# Patient Record
Sex: FEMALE | Race: WHITE | NOT HISPANIC OR LATINO | Employment: FULL TIME | URBAN - NONMETROPOLITAN AREA
[De-identification: names, ages, dates, MRNs, and addresses within clinical notes are randomized per-mention and may not be internally consistent; named-entity substitution may affect disease eponyms.]

---

## 2023-05-01 ENCOUNTER — OFFICE VISIT (OUTPATIENT)
Dept: URGENT CARE | Facility: CLINIC | Age: 48
End: 2023-05-01

## 2023-05-01 ENCOUNTER — APPOINTMENT (OUTPATIENT)
Dept: RADIOLOGY | Facility: CLINIC | Age: 48
End: 2023-05-01

## 2023-05-01 VITALS
RESPIRATION RATE: 18 BRPM | TEMPERATURE: 98.6 F | SYSTOLIC BLOOD PRESSURE: 158 MMHG | OXYGEN SATURATION: 99 % | DIASTOLIC BLOOD PRESSURE: 90 MMHG | HEIGHT: 64 IN | WEIGHT: 190 LBS | BODY MASS INDEX: 32.44 KG/M2 | HEART RATE: 104 BPM

## 2023-05-01 DIAGNOSIS — M54.16 LUMBAR RADICULOPATHY: Primary | ICD-10-CM

## 2023-05-01 DIAGNOSIS — M54.42 ACUTE BILATERAL LOW BACK PAIN WITH LEFT-SIDED SCIATICA: ICD-10-CM

## 2023-05-01 DIAGNOSIS — M54.16 LUMBAR RADICULOPATHY: ICD-10-CM

## 2023-05-01 PROBLEM — G62.9 NEUROPATHY: Status: ACTIVE | Noted: 2018-06-07

## 2023-05-01 PROBLEM — M51.9 LUMBAR DISC DISEASE: Status: ACTIVE | Noted: 2018-06-07

## 2023-05-01 PROBLEM — E03.9 HYPOTHYROID: Status: ACTIVE | Noted: 2018-06-07

## 2023-05-01 PROBLEM — I10 HYPERTENSION: Status: ACTIVE | Noted: 2018-06-07

## 2023-05-01 LAB
SL AMB  POCT GLUCOSE, UA: ABNORMAL
SL AMB LEUKOCYTE ESTERASE,UA: ABNORMAL
SL AMB POCT BILIRUBIN,UA: ABNORMAL
SL AMB POCT BLOOD,UA: ABNORMAL
SL AMB POCT CLARITY,UA: CLEAR
SL AMB POCT COLOR,UA: YELLOW
SL AMB POCT KETONES,UA: ABNORMAL
SL AMB POCT NITRITE,UA: ABNORMAL
SL AMB POCT PH,UA: 6
SL AMB POCT SPECIFIC GRAVITY,UA: 1020
SL AMB POCT URINE PROTEIN: ABNORMAL
SL AMB POCT UROBILINOGEN: 0.2

## 2023-05-01 RX ORDER — ALPRAZOLAM 0.5 MG/1
0.75 TABLET ORAL DAILY
COMMUNITY
Start: 2023-03-31

## 2023-05-01 RX ORDER — LEVOTHYROXINE SODIUM 0.15 MG/1
1 TABLET ORAL DAILY
COMMUNITY
Start: 2023-01-30

## 2023-05-01 RX ORDER — CYCLOBENZAPRINE HCL 10 MG
1 TABLET ORAL 3 TIMES DAILY PRN
COMMUNITY
Start: 2023-04-24

## 2023-05-01 RX ORDER — LOSARTAN POTASSIUM AND HYDROCHLOROTHIAZIDE 12.5; 5 MG/1; MG/1
1 TABLET ORAL DAILY
COMMUNITY
Start: 2023-04-24

## 2023-05-01 RX ORDER — LOSARTAN POTASSIUM 50 MG/1
1 TABLET ORAL DAILY
COMMUNITY
Start: 2023-01-30

## 2023-05-01 RX ORDER — PREDNISONE 10 MG/1
TABLET ORAL
Qty: 20 TABLET | Refills: 0 | Status: SHIPPED | OUTPATIENT
Start: 2023-05-01

## 2023-05-01 RX ORDER — IBUPROFEN 800 MG/1
TABLET ORAL
COMMUNITY
Start: 2023-04-27

## 2023-05-01 NOTE — PATIENT INSTRUCTIONS
Back Pain   WHAT YOU NEED TO KNOW:   Back pain is common  You may have back pain and muscle spasms  You may feel sore or stiff on one or both sides of your back  The pain may spread to your lower body  Conditions that affect the spine, joints, or muscles can cause back pain  These may include arthritis, spinal stenosis (narrowing of the spinal column), muscle tension, or breakdown of the spinal discs  DISCHARGE INSTRUCTIONS:   Call your local emergency number (911 in the [facility],[address]) if:   You have severe back pain with chest pain  You cannot control your urine or bowel movements  Your pain becomes so severe that you cannot walk  Return to the emergency department if:   You have pain, numbness, or weakness in one or both legs  You have severe back pain, nausea, and vomiting  You have severe back pain that spreads to your side or genital area  Call your doctor if:   You have back pain that does not get better with rest and pain medicine  You have a fever  You have pain that worsens when you are on your back or when you rest     You have pain that worsens when you cough or sneeze  You lose weight without trying  You have questions or concerns about your condition or care  Medicines: You may need any of the following:  NSAIDs  help decrease swelling and pain or fever  This medicine is available with or without a doctor's order  NSAIDs can cause stomach bleeding or kidney problems in certain people  If you take blood thinner medicine, always ask your healthcare provider if NSAIDs are safe for you  Always read the medicine label and follow directions  Acetaminophen  decreases pain and fever  It is available without a doctor's order  Ask how much to take and how often to take it  Follow directions   Read the labels of all other medicines you are using to see if they also contain acetaminophen, or ask your doctor or pharmacist  Acetaminophen can cause liver damage if not taken correctly  Muscle relaxers  help decrease muscle spasms and back pain  Prescription pain medicine  may be given  Ask your healthcare provider how to take this medicine safely  Some prescription pain medicines contain acetaminophen  Do not take other medicines that contain acetaminophen without talking to your healthcare provider  Too much acetaminophen may cause liver damage  Prescription pain medicine may cause constipation  Ask your healthcare provider how to prevent or treat constipation  Take your medicine as directed  Contact your healthcare provider if you think your medicine is not helping or if you have side effects  Tell your provider if you are allergic to any medicine  Keep a list of the medicines, vitamins, and herbs you take  Include the amounts, and when and why you take them  Bring the list or the pill bottles to follow-up visits  Carry your medicine list with you in case of an emergency  How to manage your back pain:   Apply ice  on your back for 15 to 20 minutes every hour or as directed  Use an ice pack, or put crushed ice in a plastic bag  Cover it with a towel before you apply it to your skin  Ice helps prevent tissue damage and decreases pain  Apply heat  on your back for 20 to 30 minutes every 2 hours for as many days as directed  Heat helps decrease pain and muscle spasms  Stay active  as much as you can without causing more pain  Bed rest could make your back pain worse  Avoid heavy lifting until your pain is gone  Go to physical therapy  as directed  A physical therapist can teach you exercises to help improve movement and strength, and to decrease pain  Follow up with your doctor in 2 weeks, or as directed: You might need to see a specialist  Write down your questions so you remember to ask them during your visits    © Copyright [employer] 2022 Information is for End User's use only and may not be sold, redistributed or otherwise used for commercial purposes  The above information is an  only  It is not intended as medical advice for individual conditions or treatments  Talk to your doctor, nurse or pharmacist before following any medical regimen to see if it is safe and effective for you

## 2023-05-01 NOTE — PROGRESS NOTES
[facility]        NAME: Lorita Osler is a 49 y o  female  : 1975    MRN: [id number]  DATE: May 1, 2023  TIME: 1:05 PM    Assessment and Plan   Lumbar radiculopathy [M54 16]  1  Lumbar radiculopathy  POCT urine dip    XR spine lumbar 2 or 3 views injury    predniSONE 10 mg tablet    Urine culture      2  Acute bilateral low back pain with left-sided sciatica              Patient Instructions   Patient Instructions     Back Pain   WHAT YOU NEED TO KNOW:   Back pain is common  You may have back pain and muscle spasms  You may feel sore or stiff on one or both sides of your back  The pain may spread to your lower body  Conditions that affect the spine, joints, or muscles can cause back pain  These may include arthritis, spinal stenosis (narrowing of the spinal column), muscle tension, or breakdown of the spinal discs  DISCHARGE INSTRUCTIONS:   Call your local emergency number (911 in the [address] [place] [address],[address]) if:    You have severe back pain with chest pain   You cannot control your urine or bowel movements   Your pain becomes so severe that you cannot walk  Return to the emergency department if:    You have pain, numbness, or weakness in one or both legs   You have severe back pain, nausea, and vomiting   You have severe back pain that spreads to your side or genital area  Call your doctor if:   Deorlando Nielsonow have back pain that does not get better with rest and pain medicine   You have a fever   You have pain that worsens when you are on your back or when you rest      You have pain that worsens when you cough or sneeze   You lose weight without trying   You have questions or concerns about your condition or care  Medicines: You may need any of the following:   NSAIDs  help decrease swelling and pain or fever  This medicine is available with or without a doctor's order  NSAIDs can cause stomach bleeding or kidney problems in certain people   If you take blood thinner medicine, always ask your healthcare provider if NSAIDs are safe for you  Always read the medicine label and follow directions   Acetaminophen  decreases pain and fever  It is available without a doctor's order  Ask how much to take and how often to take it  Follow directions  Read the labels of all other medicines you are using to see if they also contain acetaminophen, or ask your doctor or pharmacist  Acetaminophen can cause liver damage if not taken correctly   Muscle relaxers  help decrease muscle spasms and back pain   Prescription pain medicine  may be given  Ask your healthcare provider how to take this medicine safely  Some prescription pain medicines contain acetaminophen  Do not take other medicines that contain acetaminophen without talking to your healthcare provider  Too much acetaminophen may cause liver damage  Prescription pain medicine may cause constipation  Ask your healthcare provider how to prevent or treat constipation   Take your medicine as directed  Contact your healthcare provider if you think your medicine is not helping or if you have side effects  Tell your provider if you are allergic to any medicine  Keep a list of the medicines, vitamins, and herbs you take  Include the amounts, and when and why you take them  Bring the list or the pill bottles to follow-up visits  Carry your medicine list with you in case of an emergency  How to manage your back pain:    Apply ice  on your back for 15 to 20 minutes every hour or as directed  Use an ice pack, or put crushed ice in a plastic bag  Cover it with a towel before you apply it to your skin  Ice helps prevent tissue damage and decreases pain   Apply heat  on your back for 20 to 30 minutes every 2 hours for as many days as directed  Heat helps decrease pain and muscle spasms   Stay active  as much as you can without causing more pain  Bed rest could make your back pain worse  Avoid heavy lifting until your pain is gone   Go to physical therapy  as directed  A physical therapist can teach you exercises to help improve movement and strength, and to decrease pain  Follow up with your doctor in 2 weeks, or as directed: You might need to see a specialist  Write down your questions so you remember to ask them during your visits  © Copyright [employer] 2022 Information is for End User's use only and may not be sold, redistributed or otherwise used for commercial purposes  The above information is an  only  It is not intended as medical advice for individual conditions or treatments  Talk to your doctor, nurse or pharmacist before following any medical regimen to see if it is safe and effective for you  Follow up with PCP in 3-5 days  Proceed to  ER if symptoms worsen  Chief Complaint     Chief Complaint   Patient presents with    Back Pain    Hip Pain    Knee Pain     Left sided          History of Present Illness       The patient is a 79-year-old female with a past medical history significant for lumbar disc disease, thoracic disc disease, pretension, and generalized anxiety disorder who presents to the clinic complaining of severe lower back pain for the past 3 days  The patient showed me a her last MRI report that showed severe spinal stenosis and lumbar disc disease and L for L5 and mild lumbar disc disease and L to L3  She also has mild disease located in her thoracic spine  She states that her symptoms typically are well controlled with ibuprofen and a muscle relaxer as needed however she states for the 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  She states that she has been helping to lift the patient and is unsure if she injured her back with lifting the patient or if she just overdid 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 [employer]    She states typically she only has lower back pain and she is now having new symptoms of pain radiating to her left hip and down her left leg  She denies associated saddle numbness, loss of bowel function, loss of urine function, hematuria, dysuria, or vaginal discharge  She states that the pain is constant and is worse with movement  She has been having trouble walking, moving, or laying flat due to the pain  She states that she has been using her ibuprofen more than she should be over the last few days due to the amount of pain she is in  Her current pain level is 10 out of 10  Review of Systems   Review of Systems   Constitutional: Negative for chills and fever  HENT: Negative for congestion, dental problem, drooling, ear discharge, ear pain, mouth sores, nosebleeds, postnasal drip, rhinorrhea, sinus pressure, sinus pain and sore throat  Eyes: Negative for pain and visual disturbance  Respiratory: Negative for cough and shortness of breath  Cardiovascular: Negative for chest pain and palpitations  Gastrointestinal: Positive for constipation  Negative for abdominal distention, abdominal pain, anal bleeding, blood in stool, diarrhea, nausea, rectal pain and vomiting  Genitourinary: Negative for difficulty urinating, dyspareunia, dysuria, enuresis, flank pain, frequency, genital sores and hematuria  Musculoskeletal: Negative for arthralgias and back pain  Skin: Negative for color change, pallor, rash and wound  Neurological: Negative for seizures and syncope  All other systems reviewed and are negative          Current Medications       Current Outpatient Medications:     ALPRAZolam (XANAX) 0 5 mg tablet, Take 0 75 mg by mouth daily, Disp: , Rfl:     cyclobenzaprine (FLEXERIL) 10 mg tablet, Take 1 tablet by mouth 3 (three) times a day as needed, Disp: , Rfl:     levothyroxine 150 mcg tablet, Take 1 tablet by mouth daily, Disp: , Rfl:     losartan (COZAAR) 50 mg tablet, Take 1 tablet by mouth daily, Disp: , Rfl:     losartan-hydrochlorothiazide (HYZAAR) 50-12 5 mg per "tablet, Take 1 tablet by mouth daily, Disp: , Rfl:     predniSONE 10 mg tablet, 4 po for 2 days, then 3x2, 2x2, and 1x2, Disp: 20 tablet, Rfl: 0    ibuprofen (MOTRIN) 800 mg tablet, TAKE 1 TABLET BY MOUTH TWICE DAILY IF NEEDED FOR MILD PAIN OR HEADACHE, Disp: , Rfl:     Current Allergies     Allergies as of 05/01/2023 - never reviewed   Allergen Reaction Noted    Cephalexin Anaphylaxis 01/22/2018    Penicillin g Angioedema 01/28/2019            The following portions of the patient's history were reviewed and updated as appropriate: allergies, current medications, past family history, past medical history, past social history, past surgical history and problem list      Past Medical History:   Diagnosis Date    Anxiety     Hypertension     Thyroid condition        History reviewed  No pertinent surgical history  History reviewed  No pertinent family history  Medications have been verified  Objective   /90   Pulse 104   Temp 98 6 °F (37 °C)   Resp 18   Ht 5' 4\" (1 626 m)   Wt 86 2 kg (190 lb)   SpO2 99%   BMI 32 61 kg/m²        Physical Exam     Physical Exam  Constitutional:       Appearance: She is well-developed  She is not diaphoretic  Comments: The patient is in pain   HENT:      Head: Normocephalic  Eyes:      General:         Right eye: No discharge  Left eye: No discharge  Pupils: Pupils are equal, round, and reactive to light  Neck:      Thyroid: No thyromegaly  Cardiovascular:      Rate and Rhythm: Normal rate  Heart sounds: No murmur heard  Pulmonary:      Effort: Pulmonary effort is normal  No respiratory distress  Breath sounds: No wheezing or rales  Chest:      Chest wall: No tenderness  Abdominal:      General: There is no distension  Palpations: Abdomen is soft  Tenderness: There is no abdominal tenderness  There is no guarding or rebound  Musculoskeletal:      Cervical back: Normal range of motion        Thoracic " back: No swelling, lacerations, spasms or tenderness  Lumbar back: Spasms and tenderness present  No swelling, deformity or signs of trauma  Decreased range of motion  Positive right straight leg raise test  Negative left straight leg raise test         Back:    Lymphadenopathy:      Cervical: No cervical adenopathy  Skin:     General: Skin is warm  Neurological:      Mental Status: She is alert and oriented to person, place, and time  Sensory: No sensory deficit  Motor: Weakness present  Gait: Gait abnormal       Deep Tendon Reflexes: Reflexes abnormal       Comments: The patient walks with a limp on the left  -He has decreased patellar reflexes noted on the left  -She has decreased strength to her left lower extremity  X-ray was reviewed  There is severe degenerative changes with spondylolisthesis noted  -Urine did show a small amount of blood but no signs of infection  I will send out a urine culture  I urged her to contact her primary care doctor for follow-up as she likely will need repeat MRI if her symptoms fail to improve  She declined a Toradol injection in the clinic today  I will treat her with oral prednisone  She was instructed to avoid all other NSAIDs while on Toradol  She can continue to take Flexeril and up to 3000 mg of Tylenol daily  She was instructed to go to the ER if symptoms worsen

## 2023-05-04 LAB — BACTERIA UR CULT: NORMAL

## 2023-06-05 ENCOUNTER — OFFICE VISIT (OUTPATIENT)
Dept: URGENT CARE | Facility: CLINIC | Age: 48
End: 2023-06-05
Payer: COMMERCIAL

## 2023-06-05 VITALS
HEART RATE: 88 BPM | HEIGHT: 64 IN | TEMPERATURE: 98 F | OXYGEN SATURATION: 99 % | RESPIRATION RATE: 18 BRPM | WEIGHT: 192 LBS | SYSTOLIC BLOOD PRESSURE: 134 MMHG | DIASTOLIC BLOOD PRESSURE: 82 MMHG | BODY MASS INDEX: 32.78 KG/M2

## 2023-06-05 DIAGNOSIS — L01.00 IMPETIGO: Primary | ICD-10-CM

## 2023-06-05 PROCEDURE — 99213 OFFICE O/P EST LOW 20 MIN: CPT | Performed by: PHYSICIAN ASSISTANT

## 2023-06-05 RX ORDER — HYDROCODONE BITARTRATE AND ACETAMINOPHEN 5; 325 MG/1; MG/1
TABLET ORAL
COMMUNITY
Start: 2023-05-07

## 2023-06-05 RX ORDER — CLINDAMYCIN HYDROCHLORIDE 300 MG/1
300 CAPSULE ORAL 4 TIMES DAILY
Qty: 28 CAPSULE | Refills: 0 | Status: SHIPPED | OUTPATIENT
Start: 2023-06-05 | End: 2023-06-12

## 2023-06-05 NOTE — PROGRESS NOTES
[facility]        NAME: Kolby Rosas is a 51 y o  female  : 1975    MRN: [id number]  DATE: 2023  TIME: 3:04 PM    Assessment and Plan   Impetigo [L01 00]  1  Impetigo  clindamycin (CLEOCIN) 300 MG capsule            Patient Instructions   Patient Instructions   Impetigo   WHAT YOU NEED TO KNOW:   Impetigo is a skin infection caused by bacteria  The infection can cause sores to form anywhere on your body  The sores develop watery or pus-filled blisters that break and form thick crusts  Impetigo is most common in children and spreads easily from person to person  DISCHARGE INSTRUCTIONS:   Return to the emergency department if:   • You have painful, red, warm skin around the blisters  • Your face is swollen  • You urinate less than usual or there is blood in your urine  Contact your healthcare provider if:   • You have a fever  • The sores become more red, swollen, warm, or tender  • The sores do not start to heal after 3 days of treatment  • You have questions or concerns about your condition or care  Medicines:   • Antibiotics  treat the bacterial infection  Antibiotics may be given as a pill or cream  Wash your skin and gently remove any crusts before you apply the antibiotic cream      • Take your medicine as directed  Contact your healthcare provider if you think your medicine is not helping or if you have side effects  Tell your provider if you are allergic to any medicine  Keep a list of the medicines, vitamins, and herbs you take  Include the amounts, and when and why you take them  Bring the list or the pill bottles to follow-up visits  Carry your medicine list with you in case of an emergency  Prevent the spread of impetigo:   • Avoid direct contact  You can spread impetigo if someone touches or uses something that touched your infected skin  You can also spread impetigo on your own body when you touch the area and then touch somewhere else   Keep the sores covered with gauze so you will not scratch or touch them  Keep your fingernails short  Your child may need to wear mittens so he does not scratch his sores  • Wash your hands often  Always wash your hands after you touch the infected area  Wash your hands before you touch food, your eyes, or other people  If no water is available, use an alcohol-based gel to clean your hands  • Wash household items  Do not share or reuse items that have come in contact with impetigo sores  Examples include bedding, towels, washcloths, and eating utensils  These items may be used again after they have been washed with hot water and soap  Clean your sores safely:  Wash your skin sores with antibacterial soap and water  You may need to do this 2 to 3 times each day until the sores heal  If the area is crusted, gently wash the sores with gauze or a clean washcloth to remove the crust  Pat the area dry with a clean towel  Wash your hands, the washcloth, and the towel after you clean the area around the sores  Return to work or school: You may return to work or school 48 hours after you start the antibiotic medicine  If your child has impetigo, tell his school or  center about the infection  Follow up with your doctor as directed:  Write down your questions so you remember to ask them during your visits  © Copyright [facility] 2022 Information is for End User's use only and may not be sold, redistributed or otherwise used for commercial purposes  The above information is an  only  It is not intended as medical advice for individual conditions or treatments  Talk to your doctor, nurse or pharmacist before following any medical regimen to see if it is safe and effective for you  Follow up with PCP in 3-5 days  Proceed to  ER if symptoms worsen      Chief Complaint     Chief Complaint   Patient presents with   • Insect Bite         History of Present Illness       The patient is a 54-year-old female who presents to the clinic for a rash on her left index finger for approximately 3 days  Her  was seen last week in the clinic for impetigo on his face  She states that he was holding her hand and she is concerned that he may have passed the impetigo to her  She states that the rash is burning in nature  She states it started out as a blister and now has increased redness and purulent drainage  She denies associated fevers or chills  Review of Systems   Review of Systems   Skin: Positive for rash           Current Medications       Current Outpatient Medications:   •  ALPRAZolam (XANAX) 0 5 mg tablet, Take 0 75 mg by mouth daily, Disp: , Rfl:   •  clindamycin (CLEOCIN) 300 MG capsule, Take 1 capsule (300 mg total) by mouth 4 (four) times a day for 7 days, Disp: 28 capsule, Rfl: 0  •  cyclobenzaprine (FLEXERIL) 10 mg tablet, Take 1 tablet by mouth 3 (three) times a day as needed, Disp: , Rfl:   •  HYDROcodone-acetaminophen (NORCO) 5-325 mg per tablet, TAKE 1 TO 2 TABLETS BY MOUTH 3 TIMES A DAY FOR SEVERE PAIN, Disp: , Rfl:   •  ibuprofen (MOTRIN) 800 mg tablet, TAKE 1 TABLET BY MOUTH TWICE DAILY IF NEEDED FOR MILD PAIN OR HEADACHE, Disp: , Rfl:   •  levothyroxine 150 mcg tablet, Take 1 tablet by mouth daily, Disp: , Rfl:   •  losartan (COZAAR) 50 mg tablet, Take 1 tablet by mouth daily, Disp: , Rfl:   •  losartan-hydrochlorothiazide (HYZAAR) 50-12 5 mg per tablet, Take 1 tablet by mouth daily, Disp: , Rfl:   •  predniSONE 10 mg tablet, 4 po for 2 days, then 3x2, 2x2, and 1x2 (Patient not taking: Reported on 6/5/2023), Disp: 20 tablet, Rfl: 0    Current Allergies     Allergies as of 06/05/2023 - Reviewed 06/05/2023   Allergen Reaction Noted   • Cephalexin Anaphylaxis 01/22/2018   • Penicillin g Angioedema 01/28/2019            The following portions of the patient's history were reviewed and updated as appropriate: allergies, current medications, past family history, past medical history, past "social history, past surgical history and problem list      Past Medical History:   Diagnosis Date   • Anxiety    • Hypertension    • Thyroid condition        Past Surgical History:   Procedure Laterality Date   • BACK SURGERY  05/11/2023       History reviewed  No pertinent family history  Medications have been verified  Objective   /82   Pulse 88   Temp 98 °F (36 7 °C)   Resp 18   Ht 5' 4\" (1 626 m)   Wt 87 1 kg (192 lb)   SpO2 99%   BMI 32 96 kg/m²        Physical Exam     Physical Exam  Musculoskeletal:      Right hand: Swelling present  Normal strength  Normal sensation  There is no disruption of two-point discrimination  Normal capillary refill  Normal pulse  Hands:       Comments: There is an erythematous bullae noted on the posterior aspect of the left index finger that is approximately 2 cm in size  This is erythematous  There is no purulent drainage              -I will treat for podagra since her  has similar symptoms  The patient has multiple allergies to antibiotics but states that she cannot tolerate clindamycin therefore I will give her clindamycin for her symptoms  I suggest follow-up with PCP or go to the ER if symptoms worsen        "

## 2023-06-05 NOTE — PATIENT INSTRUCTIONS
Impetigo   WHAT YOU NEED TO KNOW:   Impetigo is a skin infection caused by bacteria  The infection can cause sores to form anywhere on your body  The sores develop watery or pus-filled blisters that break and form thick crusts  Impetigo is most common in children and spreads easily from person to person  DISCHARGE INSTRUCTIONS:   Return to the emergency department if:   You have painful, red, warm skin around the blisters  Your face is swollen  You urinate less than usual or there is blood in your urine  Contact your healthcare provider if:   You have a fever  The sores become more red, swollen, warm, or tender  The sores do not start to heal after 3 days of treatment  You have questions or concerns about your condition or care  Medicines:   Antibiotics  treat the bacterial infection  Antibiotics may be given as a pill or cream  Wash your skin and gently remove any crusts before you apply the antibiotic cream      Take your medicine as directed  Contact your healthcare provider if you think your medicine is not helping or if you have side effects  Tell your provider if you are allergic to any medicine  Keep a list of the medicines, vitamins, and herbs you take  Include the amounts, and when and why you take them  Bring the list or the pill bottles to follow-up visits  Carry your medicine list with you in case of an emergency  Prevent the spread of impetigo:   Avoid direct contact  You can spread impetigo if someone touches or uses something that touched your infected skin  You can also spread impetigo on your own body when you touch the area and then touch somewhere else  Keep the sores covered with gauze so you will not scratch or touch them  Keep your fingernails short  Your child may need to wear mittens so he does not scratch his sores  Wash your hands often  Always wash your hands after you touch the infected area  Wash your hands before you touch food, your eyes, or other people  If no water is available, use an alcohol-based gel to clean your hands  Wash household items  Do not share or reuse items that have come in contact with impetigo sores  Examples include bedding, towels, washcloths, and eating utensils  These items may be used again after they have been washed with hot water and soap  Clean your sores safely:  Wash your skin sores with antibacterial soap and water  You may need to do this 2 to 3 times each day until the sores heal  If the area is crusted, gently wash the sores with gauze or a clean washcloth to remove the crust  Pat the area dry with a clean towel  Wash your hands, the washcloth, and the towel after you clean the area around the sores  Return to work or school: You may return to work or school 48 hours after you start the antibiotic medicine  If your child has impetigo, tell his school or  center about the infection  Follow up with your doctor as directed:  Write down your questions so you remember to ask them during your visits  © Copyright Margarita Araiza 2022 Information is for End User's use only and may not be sold, redistributed or otherwise used for commercial purposes  The above information is an  only  It is not intended as medical advice for individual conditions or treatments  Talk to your doctor, nurse or pharmacist before following any medical regimen to see if it is safe and effective for you

## 2023-06-19 ENCOUNTER — EVALUATION (OUTPATIENT)
Dept: PHYSICAL THERAPY | Facility: CLINIC | Age: 48
End: 2023-06-19
Payer: COMMERCIAL

## 2023-06-19 DIAGNOSIS — M51.26 HERNIATION OF LUMBAR INTERVERTEBRAL DISC: Primary | ICD-10-CM

## 2023-06-19 PROCEDURE — G0283 ELEC STIM OTHER THAN WOUND: HCPCS | Performed by: PHYSICAL THERAPIST

## 2023-06-19 PROCEDURE — 97162 PT EVAL MOD COMPLEX 30 MIN: CPT | Performed by: PHYSICAL THERAPIST

## 2023-06-19 PROCEDURE — 97014 ELECTRIC STIMULATION THERAPY: CPT | Performed by: PHYSICAL THERAPIST

## 2023-06-19 NOTE — PROGRESS NOTES
PT Evaluation     Today's date: 2023  Patient name: Deya Mederos  : 1975  MRN: [id number]  Referring provider: Cathy Byrne MD  Dx:   Encounter Diagnosis     ICD-10-CM    1  Herniation of lumbar intervertebral disc  M51 26                      Assessment  Assessment details: Patient is a 51year old female who presents to PT s/p laminectomy and discectomy performed on   PT examination shows limitations including weakness, decreased rom/flexibility, limited lumbar and core stability and increased pain and numbness limiting functional independence  Patient would benefit from PT intervention including exercises for rom, strength and stability, functional training, manual therapy, HEP, balance training, aerobic conditioning and pain relieving modalities in order to maximize functional independence  Impairments: abnormal gait, abnormal muscle tone, abnormal or restricted ROM, activity intolerance, impaired physical strength, lacks appropriate home exercise program and pain with function    Goals  ST  Initiate HEP  2  Patient to report decreased pain/numbness by 25% in 4 weeks    LT  Patient to increase left LE strength to 5/5 t/o in 8 weeks  2  Patient to report decreased numbness/pain by 50-75% in 8 weeks  3   Patient to improve standing and sitting tolerance by 50% in 8 weeks    Plan  Patient would benefit from: PT eval and skilled physical therapy  Planned modality interventions: cryotherapy, low level laser therapy, thermotherapy: hydrocollator packs, ultrasound and unattended electrical stimulation  Other planned modality interventions: Modalities PRN  Planned therapy interventions: abdominal trunk stabilization, IADL retraining, ADL retraining, manual therapy, massage, balance, neuromuscular re-education, patient education, postural training, strengthening, stretching, therapeutic activities, therapeutic exercise, therapeutic training, functional ROM exercises, flexibility, graded activity, graded exercise and home exercise program  Frequency: 2x week  Duration in visits: 8  Duration in weeks: 4        Subjective Evaluation    History of Present Illness  Date of surgery: 2023  Mechanism of injury: surgery  Mechanism of injury: Patient presents to PT s/p discectomy and laminectomy done   Patient reports she has had back problems for awhile however she developed increased back pain recently after wear and tear on her back from working  Patient reports her main complaint after the surgery is the numbness in her left LE  She reports the numbness was there prior to surgery also  She reports some pain in the middle of her back but not much in lower back  Patient reports weakness in her left LE with occasions of her knee giving out  Patient was at her surgeon's last week and he said the numbness is normal and should resolve  Patient does not have specific restrictions at this time  She reports sitting and standing for longer periods of time will cause increased symptoms  She returns to surgeon's office in 3 weeks  Patient is now referred to oppt  Pain  At best pain rating: 3  At worst pain ratin  Quality: dull ache  Relieving factors: heat and medications  Aggravating factors: walking, standing and sitting    Patient Goals  Patient goal: To get the numbness gone         Objective     Concurrent Complaints  Negative for night pain, disturbed sleep, bladder dysfunction, bowel dysfunction, saddle (S4) numbness, cardiac problem, kidney problem, gallbladder problem, stomach problem, ulcer, appendix problem, spleen problem, pancreas problem, history of cancer, history of trauma and infection    Palpation   Left   Muscle spasm in the lumbar paraspinals  Tenderness of the lumbar paraspinals  Right   Muscle spasm in the lumbar paraspinals  Tenderness of the lumbar paraspinals       Neurological Testing     Sensation     Lumbar   Left   Diminished: light touch  Paresthesia: light touch    Right   Intact: light touch    Strength/Myotome Testing     Lumbar     Right   Normal strength    Left Hip   Planes of Motion   Flexion: 4-  Abduction: 4+  Adduction: 4+    Left Knee   Flexion: 4+  Extension: 4-    Left Ankle/Foot   Dorsiflexion: 4+    Muscle Activation   Patient unable to activate left transverse abdominals and right transverse abdominals       General Comments:    Lower quarter screen   Hips: unremarkable  Knees: unremarkable  Foot/ankle: unremarkable                Precautions Recent Laminectomy and Discectomy       Manuals 6/19       B/L LE Stretch                                Neuro Re-Ed         Abdominal Brace        Brace with heel slide        Brace with knee fallout                                        Ther Ex        [facility] for strength        TR/HR        Standing SLR 3-way        Standing Marches                                        Ther Activity                        Gait Training                        Modalities        IFC with MHP  Seated 15 min

## 2023-06-19 NOTE — LETTER
2023    Ismael John, [facility] [address]    Patient: Preethi Nye   YOB: 1975   Date of Visit: 2023     Encounter Diagnosis     ICD-10-CM    1  Herniation of lumbar intervertebral disc  M51 26           Dear Dr Kathi Venegas: Thank you for your recent referral of Preethi Valerio  Please review the attached evaluation summary from May's recent visit  Please verify that you agree with the plan of care by signing the attached order  If you have any questions or concerns, please do not hesitate to call  I sincerely appreciate the opportunity to share in the care of one of your patients and hope to have another opportunity to work with you in the near future  Sincerely,    Traci Ramírez, PT      Referring Provider:      I certify that I have read the below Plan of Care and certify the need for these services furnished under this plan of treatment while under my care  Ismael John MD  [facility] [address]  Via Fax: [phone]          PT Evaluation     Today's date: 2023  Patient name: Ida Flores  : 1975  MRN: [id number]  Referring provider: Ting Herrera MD  Dx:   Encounter Diagnosis     ICD-10-CM    1  Herniation of lumbar intervertebral disc  M51 26                      Assessment  Assessment details: Patient is a 51year old female who presents to PT s/p laminectomy and discectomy performed on   PT examination shows limitations including weakness, decreased rom/flexibility, limited lumbar and core stability and increased pain and numbness limiting functional independence  Patient would benefit from PT intervention including exercises for rom, strength and stability, functional training, manual therapy, HEP, balance training, aerobic conditioning and pain relieving modalities in order to maximize functional independence     Impairments: abnormal gait, abnormal muscle tone, abnormal or restricted ROM, activity intolerance, impaired physical strength, lacks appropriate home exercise program and pain with function    Goals  ST  Initiate HEP  2  Patient to report decreased pain/numbness by 25% in 4 weeks    LT  Patient to increase left LE strength to 5/5 t/o in 8 weeks  2  Patient to report decreased numbness/pain by 50-75% in 8 weeks  3  Patient to improve standing and sitting tolerance by 50% in 8 weeks    Plan  Patient would benefit from: PT eval and skilled physical therapy  Planned modality interventions: cryotherapy, low level laser therapy, thermotherapy: hydrocollator packs, ultrasound and unattended electrical stimulation  Other planned modality interventions: Modalities PRN  Planned therapy interventions: abdominal trunk stabilization, IADL retraining, ADL retraining, manual therapy, massage, balance, neuromuscular re-education, patient education, postural training, strengthening, stretching, therapeutic activities, therapeutic exercise, therapeutic training, functional ROM exercises, flexibility, graded activity, graded exercise and home exercise program  Frequency: 2x week  Duration in visits: 8  Duration in weeks: 4        Subjective Evaluation    History of Present Illness  Date of surgery: 2023  Mechanism of injury: surgery  Mechanism of injury: Patient presents to PT s/p discectomy and laminectomy done   Patient reports she has had back problems for awhile however she developed increased back pain recently after wear and tear on her back from working  Patient reports her main complaint after the surgery is the numbness in her left LE  She reports the numbness was there prior to surgery also  She reports some pain in the middle of her back but not much in lower back  Patient reports weakness in her left LE with occasions of her knee giving out  Patient was at her surgeon's last week and he said the numbness is normal and should resolve  Patient does not have specific restrictions at this time  She reports sitting and standing for longer periods of time will cause increased symptoms  She returns to surgeon's office in 3 weeks  Patient is now referred to oppt  Pain  At best pain rating: 3  At worst pain ratin  Quality: dull ache  Relieving factors: heat and medications  Aggravating factors: walking, standing and sitting    Patient Goals  Patient goal: To get the numbness gone         Objective     Concurrent Complaints  Negative for night pain, disturbed sleep, bladder dysfunction, bowel dysfunction, saddle (S4) numbness, cardiac problem, kidney problem, gallbladder problem, stomach problem, ulcer, appendix problem, spleen problem, pancreas problem, history of cancer, history of trauma and infection    Palpation   Left   Muscle spasm in the lumbar paraspinals  Tenderness of the lumbar paraspinals  Right   Muscle spasm in the lumbar paraspinals  Tenderness of the lumbar paraspinals  Neurological Testing     Sensation     Lumbar   Left   Diminished: light touch  Paresthesia: light touch    Right   Intact: light touch    Strength/Myotome Testing     Lumbar     Right   Normal strength    Left Hip   Planes of Motion   Flexion: 4-  Abduction: 4+  Adduction: 4+    Left Knee   Flexion: 4+  Extension: 4-    Left Ankle/Foot   Dorsiflexion: 4+    Muscle Activation   Patient unable to activate left transverse abdominals and right transverse abdominals       General Comments:    Lower quarter screen   Hips: unremarkable  Knees: unremarkable  Foot/ankle: unremarkable              Precautions Recent Laminectomy and Discectomy       Manuals        B/L LE Stretch                                Neuro Re-Ed         Abdominal Brace        Brace with heel slide        Brace with knee fallout                                        Ther Ex        [address] [facility] for strength        TR/HR        Standing SLR 3-way        Standing [place] [employer] Ther Activity                        Gait Training                        Modalities        IFC with MHP  Seated 15 min

## 2023-06-22 ENCOUNTER — APPOINTMENT (OUTPATIENT)
Dept: PHYSICAL THERAPY | Facility: CLINIC | Age: 48
End: 2023-06-22
Payer: COMMERCIAL

## 2023-06-28 ENCOUNTER — OFFICE VISIT (OUTPATIENT)
Dept: PHYSICAL THERAPY | Facility: CLINIC | Age: 48
End: 2023-06-28
Payer: COMMERCIAL

## 2023-06-28 DIAGNOSIS — M51.26 HERNIATION OF LUMBAR INTERVERTEBRAL DISC: Primary | ICD-10-CM

## 2023-06-28 PROCEDURE — G0283 ELEC STIM OTHER THAN WOUND: HCPCS | Performed by: PHYSICAL THERAPIST

## 2023-06-28 PROCEDURE — 97112 NEUROMUSCULAR REEDUCATION: CPT | Performed by: PHYSICAL THERAPIST

## 2023-06-28 PROCEDURE — 97014 ELECTRIC STIMULATION THERAPY: CPT | Performed by: PHYSICAL THERAPIST

## 2023-06-28 NOTE — PROGRESS NOTES
"Daily Note     Today's date: 2023  Patient name: Kat Eaton  : 1975  MRN: [id number]  Referring provider: Roseann Babinski, MD  Dx:   Encounter Diagnosis     ICD-10-CM    1  Herniation of lumbar intervertebral disc  M51 26                      Subjective: The patient states that she has been sore the last few days  Notes she still has numbness in her LLE but starting to get some feeling back along with some movement  Objective: See treatment diary below      Assessment: Initiated TE as outlined below in daily treatment diary  Tolerated treatment fair  She demonstrates decreased core and LE strength, LLE > RLE  She is unable to complete supine SLR on L leg  EStim/IFC with HP x 15 minutes to end session, reports slight decreased pain after this  Patient would benefit from continued PT  Plan: Continue per plan of care  Progress as able in upcoming visits           Precautions Recent Laminectomy and Discectomy       Manuals       B/L LE Stretch                                Neuro Re-Ed         Abdominal Brace  3\" 10x      Brace with heel slide  10x Natanael       Brace with knee fallout  10x Natanael       Brace with SLR  R only 10x   Unable on L      Brace with march  Natanael 10x       Brace with hip add  3\" 10x               Ther Ex        [address] for strength  5 mins       TR/HR        Standing SLR 3-way        Standing Marches                                        Ther Activity                        Gait Training                        Modalities        IFC with MHP  Seated 15 min Seated 15 mins                       "

## 2023-06-29 ENCOUNTER — APPOINTMENT (OUTPATIENT)
Dept: PHYSICAL THERAPY | Facility: CLINIC | Age: 48
End: 2023-06-29
Payer: COMMERCIAL

## 2023-06-30 ENCOUNTER — OFFICE VISIT (OUTPATIENT)
Dept: URGENT CARE | Facility: CLINIC | Age: 48
End: 2023-06-30
Payer: COMMERCIAL

## 2023-06-30 ENCOUNTER — APPOINTMENT (OUTPATIENT)
Dept: RADIOLOGY | Facility: CLINIC | Age: 48
End: 2023-06-30
Payer: COMMERCIAL

## 2023-06-30 VITALS
RESPIRATION RATE: 18 BRPM | DIASTOLIC BLOOD PRESSURE: 74 MMHG | OXYGEN SATURATION: 100 % | HEART RATE: 92 BPM | TEMPERATURE: 98.3 F | HEIGHT: 64 IN | SYSTOLIC BLOOD PRESSURE: 135 MMHG | WEIGHT: 192 LBS | BODY MASS INDEX: 32.78 KG/M2

## 2023-06-30 DIAGNOSIS — S83.8X2A SPRAIN OF OTHER LIGAMENT OF LEFT KNEE, INITIAL ENCOUNTER: ICD-10-CM

## 2023-06-30 DIAGNOSIS — M25.362 INSTABILITY OF LEFT KNEE JOINT: Primary | ICD-10-CM

## 2023-06-30 PROCEDURE — 73564 X-RAY EXAM KNEE 4 OR MORE: CPT

## 2023-06-30 PROCEDURE — 99213 OFFICE O/P EST LOW 20 MIN: CPT | Performed by: PHYSICIAN ASSISTANT

## 2023-06-30 NOTE — PROGRESS NOTES
[address]        NAME: Cj Blake is a 51 y o  female  : 1975    MRN: [id number]  DATE: 2023  TIME: 2:34 PM    Assessment and Plan   Instability of left knee joint [M25 362]  1  Instability of left knee joint  Ambulatory Referral to Physical Therapy      2  Sprain of other ligament of left knee, initial encounter  XR knee 4+ vw left injury            Patient Instructions   Patient Instructions     Knee Sprain   WHAT YOU NEED TO KNOW:   A knee sprain is a stretched or torn ligament in your knee  Ligaments support the knee and keep the joint and bones in the correct position  A knee sprain may involve one or more ligaments  DISCHARGE INSTRUCTIONS:   Return to the emergency department if:   • Any part of your leg feels cold, numb, or looks pale  Call your doctor if:   • You have new or increased swelling, bruising, or pain in your knee  • Your symptoms do not improve within 6 weeks, even with treatment  • You have questions or concerns about your condition or care  Medicines:   • NSAIDs , such as ibuprofen, help decrease swelling, pain, and fever  This medicine is available with or without a doctor's order  NSAIDs can cause stomach bleeding or kidney problems in certain people  If you take blood thinner medicine, always ask your healthcare provider if NSAIDs are safe for you  Always read the medicine label and follow directions  • Acetaminophen  decreases pain and fever  It is available without a doctor's order  Ask how much to take and how often to take it  Follow directions  Read the labels of all other medicines you are using to see if they also contain acetaminophen, or ask your doctor or pharmacist  Acetaminophen can cause liver damage if not taken correctly  • Prescription pain medicine  may be given  Ask your healthcare provider how to take this medicine safely  Some prescription pain medicines contain acetaminophen   Do not take other medicines that contain acetaminophen without talking to your healthcare provider  Too much acetaminophen may cause liver damage  Prescription pain medicine may cause constipation  Ask your healthcare provider how to prevent or treat constipation  • Take your medicine as directed  Contact your healthcare provider if you think your medicine is not helping or if you have side effects  Tell your provider if you are allergic to any medicine  Keep a list of the medicines, vitamins, and herbs you take  Include the amounts, and when and why you take them  Bring the list or the pill bottles to follow-up visits  Carry your medicine list with you in case of an emergency  A support device  such as a splint or brace may be needed  These devices limit movement and protect the joint while it heals  You may be given crutches to use until you can stand on your injured leg without pain  Physical therapy  may be needed  A physical therapist teaches you exercises to help improve movement and strength, and to decrease pain  Manage a knee sprain:   • Rest  your knee and do not exercise  Do not walk on your injured leg if you are told to keep weight off your knee  Rest helps decrease swelling and allows the injury to heal  You can do gentle range of motion exercises as directed to prevent stiffness  • Apply ice  on your knee for 15 to 20 minutes every hour or as directed  Use an ice pack, or put crushed ice in a plastic bag  Cover the bag with a towel before you apply it  Ice helps prevent tissue damage and decreases swelling and pain  • Apply compression  to your knee as directed  You may need to wear an elastic bandage  This helps keep your injured knee from moving too much while it heals  It should be tight enough to give support but so tight that it causes your toes to feel numb or tingly  Take the bandage off and rewrap it at least 1 time each day  • Elevate your knee  above the level of your heart as often as you can   This will help decrease swelling and pain  Prop your leg on pillows or blankets to keep it elevated comfortably  Do not put pillows directly behind your knee  Prevent another knee sprain:  Exercise your legs to keep your muscles strong  Strong leg muscles help protect your knee and prevent strain  The following may also prevent a knee sprain:  • Slowly start your exercise or training program   Slowly increase the time, distance, and intensity of your exercise  Sudden increases in training may cause another knee sprain  • Wear protective braces and equipment as directed  Braces may prevent your knee from moving the wrong way and causing another sprain  Protective equipment may support your bones and ligaments to prevent injury  • Warm up and stretch before exercise  Warm up by walking or using an exercise bike before starting your regular exercise  Do gentle stretches after warming up  This helps to loosen your muscles and decrease stress on your knee  Cool down and stretch after you exercise  • Wear shoes that fit correctly and support your feet  Replace your running or exercise shoes before the padding or shock absorption is worn out  Ask your healthcare provider which exercise shoes are best for you  Ask if you should wear shoe inserts  Shoe inserts can help support your heels and arches or keep your foot lined up correctly in your shoes  Exercise on flat surfaces  Follow up with your doctor as directed:  Write down your questions so you remember to ask them during your visits  © Copyright [employer] 2022 Information is for End User's use only and may not be sold, redistributed or otherwise used for commercial purposes  The above information is an  only  It is not intended as medical advice for individual conditions or treatments  Talk to your doctor, nurse or pharmacist before following any medical regimen to see if it is safe and effective for you    Knee Immobilizer   WHAT YOU NEED TO KNOW:   A knee immobilizer limits knee movement  It is used after an injury or surgery to help your knee, muscles, or tendons heal    DISCHARGE INSTRUCTIONS:   How to safely use a knee immobilizer:   • Have your knee immobilizer fitted by your healthcare provider  It is important that your knee immobilizer is the right size for you and that it fits properly  • Wear your knee immobilizer as directed  It can be worn over your clothing  Check the fit of the knee immobilizer often  If it does not fit properly or slips out of place, it could cause further injury  • Use crutches as directed  You may need to avoid putting weight on your injured leg  Your healthcare provider will tell you if you need crutches and for how long  • Inspect your knee immobilizer often  Do not wear your knee immobilizer if it is damaged or broken  You may need to replace it if it becomes worn  • Ask your healthcare provider how to care for your knee immobilizer  You may be able to hand wash the fabric with mild soap and water  Do not place it in the washer or dryer  • Go to physical therapy as directed  A physical therapist can help you strengthen the muscles in your leg and help your knee heal     Contact your healthcare provider if:   • Your knee pain becomes worse when you wear your knee immobilizer  • Your skin is sore or raw after you wear your knee immobilizer  • Your leg feels numb or swells while you wear your knee immobilizer  • Your knee immobilizer is damaged  • You have questions or concerns about your condition or care  Return to the emergency department if:   • You have severe swelling or pain in your leg or knee  © Copyright [employer] 2022 Information is for End User's use only and may not be sold, redistributed or otherwise used for commercial purposes  The above information is an  only   It is not intended as medical advice for individual conditions or treatments  Talk to your doctor, nurse or pharmacist before following any medical regimen to see if it is safe and effective for you  Follow up with PCP in 3-5 days  Proceed to  ER if symptoms worsen  Chief Complaint     Chief Complaint   Patient presents with   • Knee Pain     Left knee pain, fell down about 6 steps this am          History of Present Illness       Patient is a 50-year-old female with past medical history significant for lumbar disc disease who presents to the clinic for a knee injury that occurred this morning  She states that she slipped walking down the steps  She states that she fell down approximately 6 steps with her leg hyperflexed  She states that her knee gave way on her causing the fall  She did recently have a laminectomy secondary to her lumbar disc disease and states that her left lower leg has been numb since before her laminectomy  She has been undergoing physical therapy for her back but she feels that the physical therapy is making her back pain and her numbness worse  She does feel that she has had some instability of her left knee for several months  She denies any previous injury  She feels the leg does feel unstable currently  She only has pain when she touches the area due to the numbness in her lower extremity  Review of Systems   Review of Systems   Constitutional: Negative for appetite change and fatigue  Musculoskeletal: Positive for arthralgias and joint swelling  Negative for back pain, gait problem, myalgias, neck pain and neck stiffness  Skin: Negative for color change and pallor  Patient does complain of some bruising on her left lower leg which was present before today's injury  Patient states the bruising has been there since her hospital stay for her laminectomy  Neurological: Positive for numbness  Negative for dizziness, facial asymmetry and headaches           Current Medications       Current Outpatient Medications:   • "ALPRAZolam (XANAX) 0 5 mg tablet, Take 0 75 mg by mouth daily, Disp: , Rfl:   •  cyclobenzaprine (FLEXERIL) 10 mg tablet, Take 1 tablet by mouth 3 (three) times a day as needed, Disp: , Rfl:   •  HYDROcodone-acetaminophen (NORCO) 5-325 mg per tablet, TAKE 1 TO 2 TABLETS BY MOUTH 3 TIMES A DAY FOR SEVERE PAIN, Disp: , Rfl:   •  ibuprofen (MOTRIN) 800 mg tablet, TAKE 1 TABLET BY MOUTH TWICE DAILY IF NEEDED FOR MILD PAIN OR HEADACHE, Disp: , Rfl:   •  levothyroxine 150 mcg tablet, Take 1 tablet by mouth daily, Disp: , Rfl:   •  losartan (COZAAR) 50 mg tablet, Take 1 tablet by mouth daily, Disp: , Rfl:   •  losartan-hydrochlorothiazide (HYZAAR) 50-12 5 mg per tablet, Take 1 tablet by mouth daily, Disp: , Rfl:     Current Allergies     Allergies as of 06/30/2023 - Reviewed 06/30/2023   Allergen Reaction Noted   • Cephalexin Anaphylaxis 01/22/2018   • Penicillin g Angioedema 01/28/2019            The following portions of the patient's history were reviewed and updated as appropriate: allergies, current medications, past family history, past medical history, past social history, past surgical history and problem list      Past Medical History:   Diagnosis Date   • Anxiety    • Hypertension    • Thyroid condition        Past Surgical History:   Procedure Laterality Date   • BACK SURGERY  05/11/2023       History reviewed  No pertinent family history  Medications have been verified  Objective   /74   Pulse 92   Temp 98 3 °F (36 8 °C)   Resp 18   Ht 5' 4\" (1 626 m)   Wt 87 1 kg (192 lb)   SpO2 100%   BMI 32 96 kg/m²        Physical Exam     Physical Exam  Musculoskeletal:      Left hip: No tenderness or bony tenderness  Normal range of motion  Left upper leg: No swelling  Left knee: Swelling present  No ecchymosis  Decreased range of motion  Tenderness present over the lateral joint line and ACL  ACL laxity present  Instability Tests: Anterior Lachman test positive   Lateral Soila " test positive  Left lower leg: Tenderness present  No swelling  No edema  Legs:       Comments: There is tenderness to palpation noted in the patella with mild swelling  There is also tenderness to palpation in the left lateral knee joint     -Ecchymosis is noted in the left lower tibia which was present prior to today's fall            -The patient's symptoms are suspicious for an ACL injury  She was given a knee immobilizer for comfort  I did place a consult for physical therapy  She likely will need an MRI to further evaluate her ACL  She should follow-up with her PCP as she will likely need an Ortho referral or MRI

## 2023-06-30 NOTE — PATIENT INSTRUCTIONS
Knee Sprain   WHAT YOU NEED TO KNOW:   A knee sprain is a stretched or torn ligament in your knee  Ligaments support the knee and keep the joint and bones in the correct position  A knee sprain may involve one or more ligaments  DISCHARGE INSTRUCTIONS:   Return to the emergency department if:   Any part of your leg feels cold, numb, or looks pale  Call your doctor if:   You have new or increased swelling, bruising, or pain in your knee  Your symptoms do not improve within 6 weeks, even with treatment  You have questions or concerns about your condition or care  Medicines:   NSAIDs , such as ibuprofen, help decrease swelling, pain, and fever  This medicine is available with or without a doctor's order  NSAIDs can cause stomach bleeding or kidney problems in certain people  If you take blood thinner medicine, always ask your healthcare provider if NSAIDs are safe for you  Always read the medicine label and follow directions  Acetaminophen  decreases pain and fever  It is available without a doctor's order  Ask how much to take and how often to take it  Follow directions  Read the labels of all other medicines you are using to see if they also contain acetaminophen, or ask your doctor or pharmacist  Acetaminophen can cause liver damage if not taken correctly  Prescription pain medicine  may be given  Ask your healthcare provider how to take this medicine safely  Some prescription pain medicines contain acetaminophen  Do not take other medicines that contain acetaminophen without talking to your healthcare provider  Too much acetaminophen may cause liver damage  Prescription pain medicine may cause constipation  Ask your healthcare provider how to prevent or treat constipation  Take your medicine as directed  Contact your healthcare provider if you think your medicine is not helping or if you have side effects  Tell your provider if you are allergic to any medicine   Keep a list of the medicines, vitamins, and herbs you take  Include the amounts, and when and why you take them  Bring the list or the pill bottles to follow-up visits  Carry your medicine list with you in case of an emergency  A support device  such as a splint or brace may be needed  These devices limit movement and protect the joint while it heals  You may be given crutches to use until you can stand on your injured leg without pain  Physical therapy  may be needed  A physical therapist teaches you exercises to help improve movement and strength, and to decrease pain  Manage a knee sprain:   Rest  your knee and do not exercise  Do not walk on your injured leg if you are told to keep weight off your knee  Rest helps decrease swelling and allows the injury to heal  You can do gentle range of motion exercises as directed to prevent stiffness  Apply ice  on your knee for 15 to 20 minutes every hour or as directed  Use an ice pack, or put crushed ice in a plastic bag  Cover the bag with a towel before you apply it  Ice helps prevent tissue damage and decreases swelling and pain  Apply compression  to your knee as directed  You may need to wear an elastic bandage  This helps keep your injured knee from moving too much while it heals  It should be tight enough to give support but so tight that it causes your toes to feel numb or tingly  Take the bandage off and rewrap it at least 1 time each day  Elevate your knee  above the level of your heart as often as you can  This will help decrease swelling and pain  Prop your leg on pillows or blankets to keep it elevated comfortably  Do not put pillows directly behind your knee  Prevent another knee sprain:  Exercise your legs to keep your muscles strong  Strong leg muscles help protect your knee and prevent strain   The following may also prevent a knee sprain:  Slowly start your exercise or training program   Slowly increase the time, distance, and intensity of your exercise  Sudden increases in training may cause another knee sprain  Wear protective braces and equipment as directed  Braces may prevent your knee from moving the wrong way and causing another sprain  Protective equipment may support your bones and ligaments to prevent injury  Warm up and stretch before exercise  Warm up by walking or using an exercise bike before starting your regular exercise  Do gentle stretches after warming up  This helps to loosen your muscles and decrease stress on your knee  Cool down and stretch after you exercise  Wear shoes that fit correctly and support your feet  Replace your running or exercise shoes before the padding or shock absorption is worn out  Ask your healthcare provider which exercise shoes are best for you  Ask if you should wear shoe inserts  Shoe inserts can help support your heels and arches or keep your foot lined up correctly in your shoes  Exercise on flat surfaces  Follow up with your doctor as directed:  Write down your questions so you remember to ask them during your visits  © Copyright [employer] 2022 Information is for End User's use only and may not be sold, redistributed or otherwise used for commercial purposes  The above information is an  only  It is not intended as medical advice for individual conditions or treatments  Talk to your doctor, nurse or pharmacist before following any medical regimen to see if it is safe and effective for you  Knee Immobilizer   WHAT YOU NEED TO KNOW:   A knee immobilizer limits knee movement  It is used after an injury or surgery to help your knee, muscles, or tendons heal    DISCHARGE INSTRUCTIONS:   How to safely use a knee immobilizer:   Have your knee immobilizer fitted by your healthcare provider  It is important that your knee immobilizer is the right size for you and that it fits properly  Wear your knee immobilizer as directed  It can be worn over your clothing   Check the fit of the knee immobilizer often  If it does not fit properly or slips out of place, it could cause further injury  Use crutches as directed  You may need to avoid putting weight on your injured leg  Your healthcare provider will tell you if you need crutches and for how long  Inspect your knee immobilizer often  Do not wear your knee immobilizer if it is damaged or broken  You may need to replace it if it becomes worn  Ask your healthcare provider how to care for your knee immobilizer  You may be able to hand wash the fabric with mild soap and water  Do not place it in the washer or dryer  Go to physical therapy as directed  A physical therapist can help you strengthen the muscles in your leg and help your knee heal     Contact your healthcare provider if:   Your knee pain becomes worse when you wear your knee immobilizer  Your skin is sore or raw after you wear your knee immobilizer  Your leg feels numb or swells while you wear your knee immobilizer  Your knee immobilizer is damaged  You have questions or concerns about your condition or care  Return to the emergency department if:   You have severe swelling or pain in your leg or knee  © Copyright [employer] 2022 Information is for End User's use only and may not be sold, redistributed or otherwise used for commercial purposes  The above information is an  only  It is not intended as medical advice for individual conditions or treatments  Talk to your doctor, nurse or pharmacist before following any medical regimen to see if it is safe and effective for you

## 2023-07-10 ENCOUNTER — EVALUATION (OUTPATIENT)
Dept: PHYSICAL THERAPY | Facility: CLINIC | Age: 48
End: 2023-07-10
Payer: COMMERCIAL

## 2023-07-10 DIAGNOSIS — S83.512A SPRAIN OF ANTERIOR CRUCIATE LIGAMENT OF LEFT KNEE, INITIAL ENCOUNTER: Primary | ICD-10-CM

## 2023-07-10 PROCEDURE — 97164 PT RE-EVAL EST PLAN CARE: CPT | Performed by: PHYSICAL THERAPIST

## 2023-07-10 NOTE — PROGRESS NOTES
PT Re-Evaluation     Today's date: 7/10/2023  Patient name: Blanquita Stahl  : 1975  MRN: [id number]  Referring provider: Jacey Warner MD  Dx:   Encounter Diagnosis     ICD-10-CM    1. Sprain of anterior cruciate ligament of left knee, initial encounter  S83.512A                      Assessment  Assessment details: Patient is a 51year old female who presents to PT with c/o pain and instability in left knee. PT examination shows limitations including weakness, decreased quad control, abnormal gait/balance, increased instability and pain limiting functional ability. Patient would benefit from continued PT intervention with addition of left knee treatment in order to address deficits and maximize functional independence. Impairments: abnormal gait, abnormal muscle tone, abnormal or restricted ROM, activity intolerance, impaired physical strength, lacks appropriate home exercise program and pain with function    Goals  ST. Initiate HEP-   2. Patient to report decreased pain/numbness by 25% in 4 weeks    LT. Patient to increase left LE strength to 5/5 t/o in 8 weeks  2. Patient to report decreased numbness/pain by 50-75% in 8 weeks  3. Patient to improve standing and sitting tolerance by 50% in 8 weeks  4.  Patient to normalize balance and gait in 8 weeks    Plan  Patient would benefit from: skilled physical therapy  Planned modality interventions: cryotherapy, low level laser therapy, thermotherapy: hydrocollator packs, ultrasound and unattended electrical stimulation  Other planned modality interventions: Modalities PRN  Planned therapy interventions: abdominal trunk stabilization, IADL retraining, ADL retraining, manual therapy, massage, balance, neuromuscular re-education, patient education, postural training, strengthening, stretching, therapeutic activities, therapeutic exercise, therapeutic training, functional ROM exercises, flexibility, graded activity, graded exercise and home exercise program  Frequency: 2x week  Duration in visits: 8  Duration in weeks: 4        Subjective Evaluation    History of Present Illness  Date of surgery: 2023  Mechanism of injury: surgery  Mechanism of injury: Patient is currently being treated in PT s/p lumbar laminectomy and discectomy. Patient suffered a fall down 6 steps on . She reports her knee gave out on her and her knee was hyperflexed when she fell. [facility] was [facility] in [facility] and was found to have ACL laxity. Patient was referred to PCP and an MRI was ordered. Her PCP recommended PT for leg strengthening. Patient is using a knee brace for work and community mobility but does not use it at home. Patient is scheduled for an MRI tomorrow. Patient continues to have left LE numbness from prior to low back surgery. Patient will now have left knee treatment added to current low back treatment. Pain  At best pain rating: 3  At worst pain ratin  Quality: dull ache  Relieving factors: heat and medications  Aggravating factors: walking, standing and sitting    Patient Goals  Patient goals for therapy: increased strength and decreased pain  Patient goal: To get the numbness gone         Objective     Concurrent Complaints  Negative for night pain, disturbed sleep, bladder dysfunction, bowel dysfunction, saddle (S4) numbness, cardiac problem, kidney problem, gallbladder problem, stomach problem, ulcer, appendix problem, spleen problem, pancreas problem, history of cancer, history of trauma and infection    Observations   Left Knee   Positive for effusion. Palpation   Left   Muscle spasm in the lumbar paraspinals. Tenderness of the lumbar paraspinals. Right   Muscle spasm in the lumbar paraspinals. Tenderness of the lumbar paraspinals.      Neurological Testing     Sensation     Lumbar   Left   Diminished: light touch  Paresthesia: light touch    Right   Intact: light touch    Knee   Left Knee   Diminished: light touch   Paresthesia: light touch    Right Knee   Intact: light touch     Strength/Myotome Testing     Lumbar     Right   Normal strength    Left Hip   Planes of Motion   Flexion: 4-  Abduction: 4+  Adduction: 4+    Left Knee   Flexion: 4-  Extension: 3+  Quadriceps contraction: poor    Left Ankle/Foot   Dorsiflexion: 4+    Muscle Activation   Patient unable to activate left transverse abdominals and right transverse abdominals. Tests     Left Knee   Positive anterior drawer and anterior Lachman. Ambulation     Observational Gait   Gait: antalgic   Decreased walking speed, stride length, left stance time and left step length.      General Comments:    Lower quarter screen   Hips: unremarkable  Knees: unremarkable  Foot/ankle: unremarkable                Precautions Recent Laminectomy and Discectomy       Manuals 6/19 6/28 7/10     B/L LE Stretch                                Neuro Re-Ed         Abdominal Brace  3" 10x      Brace with heel slide  10x Natanael       Brace with knee fallout  10x Natanael       Brace with SLR  R only 10x   Unable on L      Brace with march  Natanael 10x       Brace with hip add  3" 10x       Quad Sets                                        Ther Ex        [address] for strength  5 mins       TR/HR        Standing SLR 3-way        Standing Marches        Heel Slides                                Ther Activity                        Gait Training                        Modalities        IFC with MHP  Seated 15 min Seated 15 mins

## 2023-07-10 NOTE — LETTER
July 10, 2023    Amelie Cooper MD  [address] [facility]   [facility] [address]    Patient: Preethi Oliva   YOB: 1975   Date of Visit: 7/10/2023     Encounter Diagnosis     ICD-10-CM    1. Sprain of anterior cruciate ligament of left knee, initial encounter  K00.868N           Dear Dr. Iwona Huff: Thank you for your recent referral of Preethi Valerio. Please review the attached evaluation summary from May's recent visit. Please verify that you agree with the plan of care by signing the attached order. If you have any questions or concerns, please do not hesitate to call. I sincerely appreciate the opportunity to share in the care of one of your patients and hope to have another opportunity to work with you in the near future. Sincerely,    Emma Pandya, PT      Referring Provider:      I certify that I have read the below Plan of Care and certify the need for these services furnished under this plan of treatment while under my care. Amelie Cooper MD  [address] [place]   [address] [place] [address],[address] [place] [address]  Via Fax: [phone]          PT Re-Evaluation     Today's date: 7/10/2023  Patient name: Radha Webb  : 1975  MRN: [id number]  Referring provider: Justina Hamilton MD  Dx:   Encounter Diagnosis     ICD-10-CM    1. Sprain of anterior cruciate ligament of left knee, initial encounter  S83.512A                      Assessment  Assessment details: Patient is a 51year old female who presents to PT with c/o pain and instability in left knee. PT examination shows limitations including weakness, decreased quad control, abnormal gait/balance, increased instability and pain limiting functional ability. Patient would benefit from continued PT intervention with addition of left knee treatment in order to address deficits and maximize functional independence.    Impairments: abnormal gait, abnormal muscle tone, abnormal or restricted ROM, activity intolerance, impaired physical strength, lacks appropriate home exercise program and pain with function    Goals  ST. Initiate HEP-   2. Patient to report decreased pain/numbness by 25% in 4 weeks    LT. Patient to increase left LE strength to 5/5 t/o in 8 weeks  2. Patient to report decreased numbness/pain by 50-75% in 8 weeks  3. Patient to improve standing and sitting tolerance by 50% in 8 weeks  4. Patient to normalize balance and gait in 8 weeks    Plan  Patient would benefit from: skilled physical therapy  Planned modality interventions: cryotherapy, low level laser therapy, thermotherapy: hydrocollator packs, ultrasound and unattended electrical stimulation  Other planned modality interventions: Modalities PRN  Planned therapy interventions: abdominal trunk stabilization, IADL retraining, ADL retraining, manual therapy, massage, balance, neuromuscular re-education, patient education, postural training, strengthening, stretching, therapeutic activities, therapeutic exercise, therapeutic training, functional ROM exercises, flexibility, graded activity, graded exercise and home exercise program  Frequency: 2x week  Duration in visits: 8  Duration in weeks: 4        Subjective Evaluation    History of Present Illness  Date of surgery: 2023  Mechanism of injury: surgery  Mechanism of injury: Patient is currently being treated in PT s/p lumbar laminectomy and discectomy. Patient suffered a fall down 6 steps on . She reports her knee gave out on her and her knee was hyperflexed when she fell. [facility] was [facility] in [facility] and was found to have ACL laxity. Patient was referred to PCP and an MRI was ordered. Her PCP recommended PT for leg strengthening. Patient is using a knee brace for work and community mobility but does not use it at home. Patient is scheduled for an MRI tomorrow. Patient continues to have left LE numbness from prior to low back surgery.  Patient will now have left knee treatment added to current low back treatment. Pain  At best pain rating: 3  At worst pain ratin  Quality: dull ache  Relieving factors: heat and medications  Aggravating factors: walking, standing and sitting    Patient Goals  Patient goals for therapy: increased strength and decreased pain  Patient goal: To get the numbness gone         Objective     Concurrent Complaints  Negative for night pain, disturbed sleep, bladder dysfunction, bowel dysfunction, saddle (S4) numbness, cardiac problem, kidney problem, gallbladder problem, stomach problem, ulcer, appendix problem, spleen problem, pancreas problem, history of cancer, history of trauma and infection    Observations   Left Knee   Positive for effusion. Palpation   Left   Muscle spasm in the lumbar paraspinals. Tenderness of the lumbar paraspinals. Right   Muscle spasm in the lumbar paraspinals. Tenderness of the lumbar paraspinals. Neurological Testing     Sensation     Lumbar   Left   Diminished: light touch  Paresthesia: light touch    Right   Intact: light touch    Knee   Left Knee   Diminished: light touch   Paresthesia: light touch    Right Knee   Intact: light touch     Strength/Myotome Testing     Lumbar     Right   Normal strength    Left Hip   Planes of Motion   Flexion: 4-  Abduction: 4+  Adduction: 4+    Left Knee   Flexion: 4-  Extension: 3+  Quadriceps contraction: poor    Left Ankle/Foot   Dorsiflexion: 4+    Muscle Activation   Patient unable to activate left transverse abdominals and right transverse abdominals. Tests     Left Knee   Positive anterior drawer and anterior Lachman. Ambulation     Observational Gait   Gait: antalgic   Decreased walking speed, stride length, left stance time and left step length.      General Comments:    Lower quarter screen   Hips: unremarkable  Knees: unremarkable  Foot/ankle: unremarkable              Precautions Recent Laminectomy and Discectomy       Manuals 6/19 6/28 7/10     B/L LE Stretch Neuro Re-Ed         Abdominal Brace  3" 10x      Brace with heel slide  10x Natanael       Brace with knee fallout  10x Natanael       Brace with SLR  R only 10x   Unable on L      Brace with march  Natanael 10x       Brace with hip add  3" 10x       Quad Sets                                        Ther Ex        [address] for strength  5 mins       TR/HR        Standing SLR 3-way        Standing Marches        Heel Slides                                Ther Activity                        Gait Training                        Modalities        IFC with MHP  Seated 15 min Seated 15 mins

## 2023-07-13 ENCOUNTER — APPOINTMENT (OUTPATIENT)
Dept: PHYSICAL THERAPY | Facility: CLINIC | Age: 48
End: 2023-07-13
Payer: COMMERCIAL

## 2023-07-19 ENCOUNTER — OFFICE VISIT (OUTPATIENT)
Dept: PHYSICAL THERAPY | Facility: CLINIC | Age: 48
End: 2023-07-19
Payer: COMMERCIAL

## 2023-07-19 DIAGNOSIS — S83.512A SPRAIN OF ANTERIOR CRUCIATE LIGAMENT OF LEFT KNEE, INITIAL ENCOUNTER: Primary | ICD-10-CM

## 2023-07-19 DIAGNOSIS — M51.26 HERNIATION OF LUMBAR INTERVERTEBRAL DISC: ICD-10-CM

## 2023-07-19 PROCEDURE — 97535 SELF CARE MNGMENT TRAINING: CPT | Performed by: PHYSICAL THERAPIST

## 2023-07-19 NOTE — PROGRESS NOTES
Daily Note     Today's date: 2023  Patient name: Samantha Brown  : 1975  MRN: [id number]  Referring provider: Lalitha Larson MD  Dx:   Encounter Diagnosis     ICD-10-CM    1. Sprain of anterior cruciate ligament of left knee, initial encounter  S83.512A       2. Herniation of lumbar intervertebral disc  M51.26                      Subjective: Patient reports she got her MRI results and goes to her family doctor today. Objective: See treatment diary below      Assessment: Patient declined exercise today until after being seen by doctor. Patient given and educated with TENS unit today. Plan: Continue per plan of care.         Precautions Recent Laminectomy and Discectomy       Manuals 6/19 6/28 7/10 7/19    B/L LE Stretch                                Neuro Re-Ed         Abdominal Brace  3" 10x      Brace with heel slide  10x Natanael       Brace with knee fallout  10x Natanael       Brace with SLR  R only 10x   Unable on L      Brace with march  Natanael 10x       Brace with hip add  3" 10x       Quad Sets                                        Ther Ex        [address] for strength  5 mins       TR/HR        Standing SLR 3-way        Standing Marches        Heel Slides                                Ther Activity            TENS unit set up 15 min            Gait Training                        Modalities        IFC with MHP  Seated 15 min Seated 15 mins

## 2023-07-31 ENCOUNTER — APPOINTMENT (OUTPATIENT)
Dept: PHYSICAL THERAPY | Facility: CLINIC | Age: 48
End: 2023-07-31
Payer: COMMERCIAL

## 2023-08-03 ENCOUNTER — APPOINTMENT (OUTPATIENT)
Dept: PHYSICAL THERAPY | Facility: CLINIC | Age: 48
End: 2023-08-03
Payer: COMMERCIAL

## 2023-08-17 ENCOUNTER — EVALUATION (OUTPATIENT)
Dept: PHYSICAL THERAPY | Facility: CLINIC | Age: 48
End: 2023-08-17
Payer: COMMERCIAL

## 2023-08-17 DIAGNOSIS — S83.512A SPRAIN OF ANTERIOR CRUCIATE LIGAMENT OF LEFT KNEE, INITIAL ENCOUNTER: Primary | ICD-10-CM

## 2023-08-17 PROCEDURE — 97164 PT RE-EVAL EST PLAN CARE: CPT | Performed by: PHYSICAL THERAPIST

## 2023-08-17 PROCEDURE — 97110 THERAPEUTIC EXERCISES: CPT | Performed by: PHYSICAL THERAPIST

## 2023-08-17 PROCEDURE — 97112 NEUROMUSCULAR REEDUCATION: CPT | Performed by: PHYSICAL THERAPIST

## 2023-08-17 NOTE — LETTER
2023    Latoya Correa [facility] [address]    Patient: Preethi Barone   YOB: 1975   Date of Visit: 2023     Encounter Diagnosis     ICD-10-CM    1. Sprain of anterior cruciate ligament of left knee, initial encounter  U33.416F           Dear Dr. Chevy Jones: Thank you for your recent referral of Preethi Valerio. Please review the attached evaluation summary from May's recent visit. Please verify that you agree with the plan of care by signing the attached order. If you have any questions or concerns, please do not hesitate to call. I sincerely appreciate the opportunity to share in the care of one of your patients and hope to have another opportunity to work with you in the near future. Sincerely,    Saintclair Sensing, PT      Referring Provider:      I certify that I have read the below Plan of Care and certify the need for these services furnished under this plan of treatment while under my care. Latoya Correa MD  [phone]  Via Fax: [phone]          PT Re-Evaluation     Today's date: 2023  Patient name: Lindsay Armando  : 1975  MRN: [id number]  Referring provider: Latoya Correa MD  Dx:   Encounter Diagnosis     ICD-10-CM    1. Sprain of anterior cruciate ligament of left knee, initial encounter  S83.512A                      Assessment  Assessment details: Patient returns to PT following missing 4 weeks of PT. Patient continues with knee pain limiting function and mobility. Patient would benefit from continued PT intervention to address limitations in order to maximize functional independence. Impairments: abnormal gait, abnormal muscle tone, abnormal or restricted ROM, activity intolerance, impaired physical strength, lacks appropriate home exercise program and pain with function    Goals  ST. Initiate HEP- Met  2.  Patient to report decreased pain/numbness by 25% in 4 weeks- Progressing    LT. Patient to increase left LE strength to 5/5 t/o in 8 weeks - Progressing  2. Patient to report decreased numbness/pain by 50-75% in 8 weeks- Progressing  3. Patient to improve standing and sitting tolerance by 50% in 8 weeks- Progressing   4. Patient to normalize balance and gait in 8 weeks- Progressing     Plan  Patient would benefit from: skilled physical therapy  Planned modality interventions: cryotherapy, low level laser therapy, thermotherapy: hydrocollator packs, ultrasound and unattended electrical stimulation  Other planned modality interventions: Modalities PRN  Planned therapy interventions: abdominal trunk stabilization, IADL retraining, ADL retraining, manual therapy, massage, balance, neuromuscular re-education, patient education, postural training, strengthening, stretching, therapeutic activities, therapeutic exercise, therapeutic training, functional ROM exercises, flexibility, graded activity, graded exercise and home exercise program  Frequency: 2x week  Duration in visits: 8  Duration in weeks: 4        Subjective Evaluation    History of Present Illness  Date of surgery: 2023  Mechanism of injury: surgery  Patient Goals  Patient goals for therapy: increased strength and decreased pain  Patient goal: To get the numbness gone   Pain  At best pain rating: 3  At worst pain ratin  Quality: dull ache  Relieving factors: heat and medications  Aggravating factors: walking, standing and sitting  Progression: no change          Objective     Concurrent Complaints  Negative for night pain, disturbed sleep, bladder dysfunction, bowel dysfunction, saddle (S4) numbness, cardiac problem, kidney problem, gallbladder problem, stomach problem, ulcer, appendix problem, spleen problem, pancreas problem, history of cancer, history of trauma and infection    Observations   Left Knee   Positive for effusion.      Palpation   Left   Muscle spasm in the lumbar paraspinals. Tenderness of the lumbar paraspinals. Right   Muscle spasm in the lumbar paraspinals. Tenderness of the lumbar paraspinals. Neurological Testing     Sensation     Lumbar   Left   Diminished: light touch  Paresthesia: light touch    Right   Intact: light touch    Knee   Left Knee   Diminished: light touch   Paresthesia: light touch    Right Knee   Intact: light touch     Strength/Myotome Testing     Lumbar     Right   Normal strength    Left Hip   Planes of Motion   Flexion: 4-  Abduction: 4+  Adduction: 4+    Left Knee   Flexion: 4-  Extension: 3+  Quadriceps contraction: poor    Left Ankle/Foot   Dorsiflexion: 4+    Additional Strength Details  Ordering Home e-stim unit for atrophy and weakness in left thigh. Muscle Activation   Patient unable to activate left transverse abdominals and right transverse abdominals. Tests     Left Knee   Positive anterior drawer and anterior Lachman. Ambulation     Observational Gait   Gait: antalgic   Decreased walking speed, stride length, left stance time and left step length.      General Comments:    Lower quarter screen   Hips: unremarkable  Knees: unremarkable  Foot/ankle: unremarkable              Precautions Recent Laminectomy and Discectomy       Manuals 6/19 6/28 7/10 7/19 8/17   B/L LE Stretch                                Neuro Re-Ed         Abdominal Brace  3" 10x      Brace with heel slide  10x Natanael    2x10   Brace with knee fallout  10x Natanael       Brace with SLR  R only 10x   Unable on L      Brace with march  Natanael 10x       Brace with hip add  3" 10x       Quad Sets     2x10                                   Ther Ex        [address] for strength  5 mins       TR/HR     2x10   Standing SLR 3-way     10x B/L, each   Standing Marches        Heel Slides        SAQ     10x                   Ther Activity            TENS unit set up 15 min            Gait Training                        Modalities        IFC with MHP  Seated 15 min Seated 15 mins   15 min

## 2023-08-17 NOTE — PROGRESS NOTES
PT Re-Evaluation     Today's date: 2023  Patient name: Vida Favre  : 1975  MRN: [id number]  Referring provider: Mary Bland MD  Dx:   Encounter Diagnosis     ICD-10-CM    1. Sprain of anterior cruciate ligament of left knee, initial encounter  S83.512A                      Assessment  Assessment details: Patient returns to PT following missing 4 weeks of PT. Patient continues with knee pain limiting function and mobility. Patient would benefit from continued PT intervention to address limitations in order to maximize functional independence. Impairments: abnormal gait, abnormal muscle tone, abnormal or restricted ROM, activity intolerance, impaired physical strength, lacks appropriate home exercise program and pain with function    Goals  ST. Initiate HEP- Met  2. Patient to report decreased pain/numbness by 25% in 4 weeks- Progressing    LT. Patient to increase left LE strength to 5/5 t/o in 8 weeks - Progressing  2. Patient to report decreased numbness/pain by 50-75% in 8 weeks- Progressing  3. Patient to improve standing and sitting tolerance by 50% in 8 weeks- Progressing   4.  Patient to normalize balance and gait in 8 weeks- Progressing     Plan  Patient would benefit from: skilled physical therapy  Planned modality interventions: cryotherapy, low level laser therapy, thermotherapy: hydrocollator packs, ultrasound and unattended electrical stimulation  Other planned modality interventions: Modalities PRN  Planned therapy interventions: abdominal trunk stabilization, IADL retraining, ADL retraining, manual therapy, massage, balance, neuromuscular re-education, patient education, postural training, strengthening, stretching, therapeutic activities, therapeutic exercise, therapeutic training, functional ROM exercises, flexibility, graded activity, graded exercise and home exercise program  Frequency: 2x week  Duration in visits: 8  Duration in weeks: 4        Subjective Evaluation    History of Present Illness  Date of surgery: 2023  Mechanism of injury: surgery  Patient Goals  Patient goals for therapy: increased strength and decreased pain  Patient goal: To get the numbness gone   Pain  At best pain rating: 3  At worst pain ratin  Quality: dull ache  Relieving factors: heat and medications  Aggravating factors: walking, standing and sitting  Progression: no change          Objective     Concurrent Complaints  Negative for night pain, disturbed sleep, bladder dysfunction, bowel dysfunction, saddle (S4) numbness, cardiac problem, kidney problem, gallbladder problem, stomach problem, ulcer, appendix problem, spleen problem, pancreas problem, history of cancer, history of trauma and infection    Observations   Left Knee   Positive for effusion. Palpation   Left   Muscle spasm in the lumbar paraspinals. Tenderness of the lumbar paraspinals. Right   Muscle spasm in the lumbar paraspinals. Tenderness of the lumbar paraspinals. Neurological Testing     Sensation     Lumbar   Left   Diminished: light touch  Paresthesia: light touch    Right   Intact: light touch    Knee   Left Knee   Diminished: light touch   Paresthesia: light touch    Right Knee   Intact: light touch     Strength/Myotome Testing     Lumbar     Right   Normal strength    Left Hip   Planes of Motion   Flexion: 4-  Abduction: 4+  Adduction: 4+    Left Knee   Flexion: 4-  Extension: 3+  Quadriceps contraction: poor    Left Ankle/Foot   Dorsiflexion: 4+    Additional Strength Details  Ordering Home e-stim unit for atrophy and weakness in left thigh. Muscle Activation   Patient unable to activate left transverse abdominals and right transverse abdominals. Tests     Left Knee   Positive anterior drawer and anterior Lachman. Ambulation     Observational Gait   Gait: antalgic   Decreased walking speed, stride length, left stance time and left step length.      General Comments:    Lower quarter screen   Hips: unremarkable  Knees: unremarkable  Foot/ankle: unremarkable                Precautions Recent Laminectomy and Discectomy       Manuals 6/19 6/28 7/10 7/19 8/17   B/L LE Stretch                                Neuro Re-Ed         Abdominal Brace  3" 10x      Brace with heel slide  10x Natanael    2x10   Brace with knee fallout  10x Natanael       Brace with SLR  R only 10x   Unable on L      Brace with march  Natanael 10x       Brace with hip add  3" 10x       Quad Sets     2x10                                   Ther Ex        [facility] for strength  5 mins       TR/HR     2x10   Standing SLR 3-way     10x B/L, each   Standing Marches        Heel Slides        SAQ     10x                   Ther Activity            TENS unit set up 15 min            Gait Training                        Modalities        IFC with MHP  Seated 15 min Seated 15 mins   15 min

## 2023-08-23 ENCOUNTER — APPOINTMENT (OUTPATIENT)
Dept: PHYSICAL THERAPY | Facility: CLINIC | Age: 48
End: 2023-08-23
Payer: COMMERCIAL

## 2024-01-25 ENCOUNTER — TELEPHONE (OUTPATIENT)
Age: 49
End: 2024-01-25

## 2024-01-25 ENCOUNTER — EVALUATION (OUTPATIENT)
Dept: PHYSICAL THERAPY | Facility: CLINIC | Age: 49
End: 2024-01-25
Payer: COMMERCIAL

## 2024-01-25 DIAGNOSIS — M25.562 CHRONIC PAIN OF LEFT KNEE: Primary | ICD-10-CM

## 2024-01-25 DIAGNOSIS — G89.29 CHRONIC PAIN OF LEFT KNEE: Primary | ICD-10-CM

## 2024-01-25 PROCEDURE — 97116 GAIT TRAINING THERAPY: CPT | Performed by: PHYSICAL THERAPIST

## 2024-01-25 PROCEDURE — 97162 PT EVAL MOD COMPLEX 30 MIN: CPT | Performed by: PHYSICAL THERAPIST

## 2024-01-25 NOTE — PROGRESS NOTES
PT Evaluation     Today's date: 2024  Patient name: Preethi Valerio  : 1975  MRN: [id number]  Referring provider: Carroll Avila  Dx:   Encounter Diagnosis     ICD-10-CM    1. Chronic pain of left knee  M25.562     G89.29                      Assessment  Assessment details: Patient is a 48 year old female who presents to PT with c/o pain in left knee. PT examination show limitations including weakness, decreased quad control, antalgic gait, decreased balance and increased pain limiting functional ability. Patient would benefit from PT intervention including exercises for rom, strength and stability, functional training, aerobic conditioning and pain relieving modalities in order to maximize functional independence and mobility.   Impairments: abnormal gait, activity intolerance, impaired balance, impaired physical strength, lacks appropriate home exercise program, pain with function and safety issue    Goals  ST. Initiate HEP  2. Patient to report decreased pain at worst by 50% in 4 weeks    LT. Patient to report decreased pain at worst by % in 8 weeks  2. Patient to increase LE strength to 5/5 in 8 weeks    Plan  Patient would benefit from: PT eval and skilled physical therapy  Planned modality interventions: cryotherapy and thermotherapy: hydrocollator packs  Other planned modality interventions: Modalities PRN  Planned therapy interventions: ADL retraining, manual therapy, balance, neuromuscular re-education, patient education, strengthening, stretching, therapeutic activities, therapeutic exercise, therapeutic training, graded activity, functional ROM exercises, flexibility, graded exercise and home exercise program  Frequency: 2x week  Duration in visits: 8  Duration in weeks: 4  Treatment plan discussed with: patient      Subjective Evaluation    History of Present Illness  Mechanism of injury: Patient presents to PT with c/o pain in left knee. Patient reports she initially injured her  knee when she fell down steps on . She was put in a brace and wore it for 5 months. She continued with pain and was seen by another orthopedic. He told her to d/c brace and that she needs to walk and strengthen her knee. Patient is now referred to oppt.   Patient Goals  Patient goals for therapy: decreased pain and increased strength    Pain  At best pain rating: 3  At worst pain ratin        Objective     Neurological Testing     Sensation     Knee   Left Knee   Intact: Light touch    Right Knee   Intact: light touch     Active Range of Motion   Left Knee   Normal active range of motion    Right Knee   Normal active range of motion    Strength/Myotome Testing     Left Knee   Flexion: 4  Extension: 4  Quadriceps contraction: fair    Right Knee   Normal strength    Ambulation     Observational Gait   Gait: antalgic   Decreased walking speed, stride length, left stance time and left step length.               Precautions None       Manuals                                        Neuro Re-Ed                                                                 Ther Ex        Treadmill for strength 15 min 1.3 MPH       TR/HR        Standing SLR 3-way        QS        SAQ        SLR        Heel Slides                                Ther Activity                        Gait Training                        Modalities

## 2024-01-25 NOTE — LETTER
2024    Carroll Collinscesar  [address] [place] [address], [address]  [facility] [address]    Patient: Preethi Valerio   YOB: 1975   Date of Visit: 2024     Encounter Diagnosis     ICD-10-CM    1. Chronic pain of left knee  M25.562     G89.29           Dear Dr. Avila:    Thank you for your recent referral of Preethi Valerio. Please review the attached evaluation summary from May's recent visit.     Please verify that you agree with the plan of care by signing the attached order.     If you have any questions or concerns, please do not hesitate to call.     I sincerely appreciate the opportunity to share in the care of one of your patients and hope to have another opportunity to work with you in the near future.       Sincerely,    Carroll Laguna, PT      Referring Provider:      I certify that I have read the below Plan of Care and certify the need for these services furnished under this plan of treatment while under my care.                    Carroll Margarita  [address] [place] [address], [address] [address]  Via In Basket          PT Evaluation     Today's date: 2024  Patient name: Preethi Valerio  : 1975  MRN: [id number]  Referring provider: Carroll Avila  Dx:   Encounter Diagnosis     ICD-10-CM    1. Chronic pain of left knee  M25.562     G89.29                      Assessment  Assessment details: Patient is a 48 year old female who presents to PT with c/o pain in left knee. PT examination show limitations including weakness, decreased quad control, antalgic gait, decreased balance and increased pain limiting functional ability. Patient would benefit from PT intervention including exercises for rom, strength and stability, functional training, aerobic conditioning and pain relieving modalities in order to maximize functional independence and mobility.   Impairments: abnormal gait, activity intolerance, impaired balance, impaired physical strength, lacks appropriate home exercise program, pain with  function and safety issue    Goals  ST. Initiate HEP  2. Patient to report decreased pain at worst by 50% in 4 weeks    LT. Patient to report decreased pain at worst by % in 8 weeks  2. Patient to increase LE strength to 5/5 in 8 weeks    Plan  Patient would benefit from: PT eval and skilled physical therapy  Planned modality interventions: cryotherapy and thermotherapy: hydrocollator packs  Other planned modality interventions: Modalities PRN  Planned therapy interventions: ADL retraining, manual therapy, balance, neuromuscular re-education, patient education, strengthening, stretching, therapeutic activities, therapeutic exercise, therapeutic training, graded activity, functional ROM exercises, flexibility, graded exercise and home exercise program  Frequency: 2x week  Duration in visits: 8  Duration in weeks: 4  Treatment plan discussed with: patient      Subjective Evaluation    History of Present Illness  Mechanism of injury: Patient presents to PT with c/o pain in left knee. Patient reports she initially injured her knee when she fell down steps on . She was put in a brace and wore it for 5 months. She continued with pain and was seen by another orthopedic. He told her to d/c brace and that she needs to walk and strengthen her knee. Patient is now referred to oppt.   Patient Goals  Patient goals for therapy: decreased pain and increased strength    Pain  At best pain rating: 3  At worst pain ratin        Objective     Neurological Testing     Sensation     Knee   Left Knee   Intact: Light touch    Right Knee   Intact: light touch     Active Range of Motion   Left Knee   Normal active range of motion    Right Knee   Normal active range of motion    Strength/Myotome Testing     Left Knee   Flexion: 4  Extension: 4  Quadriceps contraction: fair    Right Knee   Normal strength    Ambulation     Observational Gait   Gait: antalgic   Decreased walking speed, stride length, left stance time  and left step length.               Precautions None       Manuals 1/25                                       Neuro Re-Ed                                                                 Ther Ex        Treadmill for strength 15 min 1.3 MPH       TR/HR        Standing SLR 3-way        QS        SAQ        SLR        Heel Slides                                Ther Activity                        Gait Training                        Modalities

## 2024-01-25 NOTE — TELEPHONE ENCOUNTER
Caller: patient    Doctor: n/a    Reason for call: called to schedule an appt. With Dr Clancy but did not agree with disclaimer. Advised her Comp Spine and patient declined    Call back#:      no nipple discharge L/no nipple discharge R

## 2024-01-29 ENCOUNTER — APPOINTMENT (OUTPATIENT)
Dept: PHYSICAL THERAPY | Facility: CLINIC | Age: 49
End: 2024-01-29
Payer: COMMERCIAL

## 2024-01-31 ENCOUNTER — APPOINTMENT (OUTPATIENT)
Dept: PHYSICAL THERAPY | Facility: CLINIC | Age: 49
End: 2024-01-31
Payer: COMMERCIAL

## 2025-07-16 ENCOUNTER — APPOINTMENT (OUTPATIENT)
Dept: RADIOLOGY | Facility: CLINIC | Age: 50
End: 2025-07-16
Payer: COMMERCIAL

## 2025-07-16 ENCOUNTER — OFFICE VISIT (OUTPATIENT)
Dept: URGENT CARE | Facility: CLINIC | Age: 50
End: 2025-07-16
Payer: COMMERCIAL

## 2025-07-16 VITALS
WEIGHT: 190 LBS | SYSTOLIC BLOOD PRESSURE: 128 MMHG | HEART RATE: 85 BPM | TEMPERATURE: 99.4 F | HEIGHT: 64 IN | DIASTOLIC BLOOD PRESSURE: 72 MMHG | OXYGEN SATURATION: 98 % | BODY MASS INDEX: 32.44 KG/M2 | RESPIRATION RATE: 18 BRPM

## 2025-07-16 DIAGNOSIS — S89.92XA INJURY OF LEFT LOWER EXTREMITY, INITIAL ENCOUNTER: ICD-10-CM
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
## 2025-07-16 DIAGNOSIS — M23.92 INTERNAL DERANGEMENT OF LEFT KNEE: Primary | ICD-10-CM

## 2025-07-16 DIAGNOSIS — M54.6 ACUTE RIGHT-SIDED THORACIC BACK PAIN: ICD-10-CM

## 2025-07-16 PROBLEM — D64.9 ANEMIA: Status: ACTIVE | Noted: 2018-06-07

## 2025-07-16 PROBLEM — D21.9 FIBROID: Status: ACTIVE | Noted: 2025-07-16

## 2025-07-16 PROBLEM — F41.9 ANXIETY: Status: ACTIVE | Noted: 2018-06-07

## 2025-07-16 PROBLEM — M51.26 HERNIATED LUMBAR INTERVERTEBRAL DISC: Status: ACTIVE | Noted: 2023-05-08

## 2025-07-16 PROBLEM — N92.6 IRREGULAR MENSES: Status: ACTIVE | Noted: 2025-07-16

## 2025-07-16 PROCEDURE — G0383 LEV 4 HOSP TYPE B ED VISIT: HCPCS

## 2025-07-16 PROCEDURE — 72072 X-RAY EXAM THORAC SPINE 3VWS: CPT

## 2025-07-16 PROCEDURE — 73590 X-RAY EXAM OF LOWER LEG: CPT

## 2025-07-16 PROCEDURE — S9083 URGENT CARE CENTER GLOBAL: HCPCS

## 2025-07-16 PROCEDURE — 73564 X-RAY EXAM KNEE 4 OR MORE: CPT

## 2025-07-16 RX ORDER — ACETAMINOPHEN 325 MG/1
650 TABLET ORAL ONCE
Status: COMPLETED | OUTPATIENT
Start: 2025-07-16 | End: 2025-07-16

## 2025-07-16 RX ORDER — TIRZEPATIDE 2.5 MG/.5ML
2.5 INJECTION, SOLUTION SUBCUTANEOUS WEEKLY
COMMUNITY

## 2025-07-16 RX ORDER — IBUPROFEN 200 MG
400 TABLET ORAL ONCE
Status: COMPLETED | OUTPATIENT
Start: 2025-07-16 | End: 2025-07-16

## 2025-07-16 RX ADMIN — ACETAMINOPHEN 650 MG: 325 TABLET ORAL at 15:10

## 2025-07-16 RX ADMIN — Medication 400 MG: at 15:10

## 2025-07-16 NOTE — PROGRESS NOTES
[facility]  Name: Preethi Valerio      : 1975      MRN: [id number]  Encounter Provider: SANDHYA Marte  Encounter Date: 2025   Encounter department: [facility] [place]  :  Assessment & Plan  Fall, initial encounter    Orders:  •  XR knee 4+ vw left injury; Future  •  XR tibia fibula 2 vw left; Future  •  XR spine thoracic 3 vw; Future  •  acetaminophen (TYLENOL) tablet 650 mg  •  ibuprofen (MOTRIN) tablet 400 mg  •  Orthopedic injury treatment  •  Ambulatory Referral to Orthopedic Surgery; Future    Injury of left lower extremity, initial encounter    Orders:  •  XR knee 4+ vw left injury; Future  •  XR tibia fibula 2 vw left; Future  •  acetaminophen (TYLENOL) tablet 650 mg  •  ibuprofen (MOTRIN) tablet 400 mg  •  Orthopedic injury treatment  •  Ambulatory Referral to Orthopedic Surgery; Future    Acute right-sided thoracic back pain    Orders:  •  XR spine thoracic 3 vw; Future  •  acetaminophen (TYLENOL) tablet 650 mg  •  ibuprofen (MOTRIN) tablet 400 mg  •  Orthopedic injury treatment  •  Ambulatory Referral to Orthopedic Surgery; Future    Internal derangement of left knee    Orders:  •  Splint application  •  Orthopedic injury treatment  •  Ambulatory Referral to Orthopedic Surgery; Future        Patient Instructions  Follow up with PCP in 3-5 days.  Proceed to  ER if symptoms worsen.    If tests are performed, our office will contact you with results only if changes need to made to the care plan discussed with you at the visit. You can review your full results on [facility].    Chief Complaint:   Chief Complaint   Patient presents with   • Fall     Fell outside at 10:00am this morning. Having pain in posterior left knee and right mid back. Unable to bear weight on left leg. Did not feel pain at first but has numbness all the time from left mid leg down since a back surgery 2 years ago.     History of Present Illness   Patient here after falling while outside  at 10am this AM. Fell tripping over a planter. She landed forward, braced her fall with her arms and nearly stuck her head but did not. Does not take blood thinners. She did proceed to work and was unable to continue her day. 2 years ago had back surgery and has since had left leg numbness since that time. She has had ongoing problems with the left knee and leg since that time, typically walks with a limp. She is here with pain in her left posterior knee when attempting to bear weight and soreness/tenderness of the right mid back. Unable to bear weight on the left leg. She initially did not have any pain at first but has since started with increased pain. Abrasions noted to b/l knees. Has not taken anything for the pain. Offered toradol, decline IM injection- will do oral tylenol/ibuprofen.    (5/11/2023- Left L3-L4 laminectomy and discectomy done at [facility]) Denies any lower back pain at this time.     Provider Radiology Interpretation (preliminary)   Final results will be as per official Radiology Report when available:   No acute osseous abnormalities    Discussed treatment options. Will given Hinged knee to help provide support and decrease feeling of knee giving out. Will also provide walker as for her safety with ambulation I do not feel that she would safely be able to manage using crutches given her already existing ambulatory problem secondary to her chronic back problems. Will provide ortho referral for follow up. Patient agreeable to same.       History obtained from: patient    Review of Systems   Constitutional:  Negative for chills, fatigue and fever.   Genitourinary:  Negative for urgency.   Musculoskeletal:  Positive for back pain and gait problem.   Skin:  Positive for wound.     Past Medical History   Past Medical History[1]  Past Surgical History[2]  Family History[3]  she reports that she has never smoked. She has never used smokeless tobacco. She reports current alcohol use. She reports that she  "does not use drugs.  Current Outpatient Medications   Medication Instructions   • ALPRAZolam (XANAX) 0.75 mg, Daily   • cyclobenzaprine (FLEXERIL) 10 mg tablet 1 tablet, 3 times daily PRN   • HYDROcodone-acetaminophen (NORCO) 5-325 mg per tablet TAKE 1 TO 2 TABLETS BY MOUTH 3 TIMES A DAY FOR SEVERE PAIN   • ibuprofen (MOTRIN) 800 mg tablet    • levothyroxine 150 mcg tablet 1 tablet, Daily   • losartan (COZAAR) 50 mg tablet 1 tablet, Daily   • losartan-hydrochlorothiazide (HYZAAR) 50-12.5 mg per tablet 1 tablet, Daily   • Zepbound 2.5 mg, Weekly   Allergies[4]     Objective   /72   Pulse 85   Temp 99.4 °F (37.4 °C)   Resp 18   Ht 5' 4\" (1.626 m)   Wt 86.2 kg (190 lb)   LMP 07/10/2025 (Exact Date)   SpO2 98%   BMI 32.61 kg/m²      Physical Exam  Vitals and nursing note reviewed.   Constitutional:       General: She is not in acute distress.     Appearance: She is well-developed and normal weight. She is not ill-appearing.   HENT:      Head: Normocephalic and atraumatic.     Cardiovascular:      Rate and Rhythm: Normal rate and regular rhythm.      Heart sounds: No murmur heard.  Pulmonary:      Effort: Pulmonary effort is normal. No respiratory distress.      Breath sounds: Normal breath sounds.   Abdominal:      Palpations: Abdomen is soft.      Tenderness: There is no abdominal tenderness.     Musculoskeletal:         General: No swelling.      Cervical back: Neck supple. No tenderness or bony tenderness. No pain with movement.      Thoracic back: Tenderness present. No bony tenderness. No scoliosis.      Lumbar back: No tenderness or bony tenderness.      Right hip: No tenderness or bony tenderness. Normal range of motion.      Left hip: No tenderness or bony tenderness. Normal range of motion.      Right upper leg: No tenderness or bony tenderness.      Left upper leg: No swelling, tenderness or bony tenderness.      Right knee: Swelling present. Normal range of motion. Normal pulse.      Left knee: " "Swelling present. Normal range of motion. Normal pulse.      Left lower leg: Swelling present.        Legs:       Comments: Limited exam- patient reports her chronic numbness makes it difficult to determine if she is having pain upon palpation/manipulation. Can feel touch, but reports difficult with determining extent of discomfort.     Unable to bear weight on the left knee- pain pulling feeling in the left knee. Feeling of knee giving out.        Skin:     General: Skin is warm and dry.      Capillary Refill: Capillary refill takes less than 2 seconds.      Findings: Abrasion and wound present.     Neurological:      General: No focal deficit present.      Mental Status: She is alert and oriented to person, place, and time. Mental status is at baseline.      GCS: GCS eye subscore is 4. GCS verbal subscore is 5. GCS motor subscore is 6.     Psychiatric:         Attention and Perception: Attention normal.         Mood and Affect: Mood normal.         Cognition and Memory: Cognition normal.         Judgment: Judgment normal.         Portions of the record may have been created with voice recognition software.  Occasional wrong word or \"sound a like\" substitutions may have occurred due to the inherent limitations of voice recognition software.  Read the chart carefully and recognize, using context, where substitutions have occurred.    Orthopedic injury treatment    Date/Time: 7/16/2025 3:34 PM    Performed by: SANDHYA Marte  Authorized by: SANDHYA Marte    Patient Location:  Clinic    [place] Protocol:  procedure performed by consultantConsent: Verbal consent obtained  Risks and benefits: risks, benefits and alternatives were discussed  Consent given by: patient  Time out: Immediately prior to procedure a \"time out\" was called to verify the correct patient, procedure, equipment, support staff and site/side marked as required.  Patient understanding: patient states understanding of the " procedure being performed  Patient consent: the patient's understanding of the procedure matches consent given  Procedure consent: procedure consent matches procedure scheduled    Injury location:  Knee  Location details:  Left knee  Injury type:  Soft tissue  Distal perfusion: normal    Neurological function: normal    Range of motion: normal    Splint type: DME Hinged Knee brace.  Distal perfusion: normal    Neurological function: normal    Range of motion: normal    Patient tolerance:  Patient tolerated the procedure well with no immediate complications   Also given walker for ambulatory safety. Patient able to ambulate with the assistance of the walker and knee brace.              [1]  Past Medical History:  Diagnosis Date   • Anxiety    • Hypertension    • Thyroid condition    [2]  Past Surgical History:  Procedure Laterality Date   • BACK SURGERY  05/11/2023   [3]  No family history on file.[4]  Allergies  Allergen Reactions   • Cephalexin Anaphylaxis   • Penicillin G Angioedema

## 2025-07-16 NOTE — PATIENT INSTRUCTIONS
You may take over the counter Tylenol (Acetaminophen) and/or Motrin (Ibuprofen) as needed, as directed on packaging.   Please follow up with your primary provider in the next several days. Should you have any worsening of symptoms, or lack of improvement please be re-evaluated. If needed for significant concerns, consider 911 or ER evaluation.     Your xray was read by the provider you saw today in the office as a preliminary result. Your xray will be reviewed and an official reading will be provided by a Radiologist. If you have access to My Chart you can see these results there. Also if there is any significant findings you will be contacted with those results.